# Patient Record
Sex: FEMALE | Race: WHITE | NOT HISPANIC OR LATINO | Employment: OTHER | RURAL
[De-identification: names, ages, dates, MRNs, and addresses within clinical notes are randomized per-mention and may not be internally consistent; named-entity substitution may affect disease eponyms.]

---

## 2020-08-17 ENCOUNTER — OFFICE VISIT (OUTPATIENT)
Dept: FAMILY MEDICINE CLINIC | Facility: CLINIC | Age: 75
End: 2020-08-17

## 2020-08-17 VITALS
BODY MASS INDEX: 34.69 KG/M2 | RESPIRATION RATE: 18 BRPM | OXYGEN SATURATION: 95 % | WEIGHT: 208.2 LBS | SYSTOLIC BLOOD PRESSURE: 126 MMHG | TEMPERATURE: 98.2 F | DIASTOLIC BLOOD PRESSURE: 80 MMHG | HEART RATE: 73 BPM | HEIGHT: 65 IN

## 2020-08-17 DIAGNOSIS — Z76.89 ENCOUNTER TO ESTABLISH CARE: ICD-10-CM

## 2020-08-17 DIAGNOSIS — R06.02 SHORTNESS OF BREATH: ICD-10-CM

## 2020-08-17 DIAGNOSIS — M25.562 ACUTE PAIN OF LEFT KNEE: Primary | ICD-10-CM

## 2020-08-17 PROCEDURE — 99204 OFFICE O/P NEW MOD 45 MIN: CPT | Performed by: FAMILY MEDICINE

## 2020-08-17 RX ORDER — PHENOL 1.4 %
600 AEROSOL, SPRAY (ML) MUCOUS MEMBRANE 2 TIMES DAILY WITH MEALS
COMMUNITY

## 2020-08-17 RX ORDER — IBUPROFEN 200 MG
200 TABLET ORAL EVERY 6 HOURS PRN
COMMUNITY

## 2020-08-17 RX ORDER — MULTIPLE VITAMINS W/ MINERALS TAB 9MG-400MCG
1 TAB ORAL DAILY
COMMUNITY
End: 2022-10-05

## 2020-08-17 NOTE — PROGRESS NOTES
Chief Complaint   Patient presents with   • Knee Pain     Left knee   • Establish Care   • Shortness of Breath       History of Present Illness:  Subjective   Judith Sauceda is a 75 y.o. female.   8/17/2020- The patient is here today to establish care with a new PCP. She state that she overall feels well with a few complaints. She states that her old doctor was Dr. Gonzalez in [place] but she states that she was only seen once. She states that she has a few things she wants to discuss today.     Knee Pain    The incident occurred more than 1 week ago (started hurting at the end of july ). There was no injury mechanism. The pain is present in the left leg, left hip, left knee and left ankle. The quality of the pain is described as aching, shooting, stabbing and cramping. The pain is at a severity of 7/10. The pain is moderate. The pain has been constant since onset. Associated symptoms include an inability to bear weight and a loss of sensation. Pertinent negatives include no loss of motion, muscle weakness, numbness or tingling. Associated symptoms comments: Patient states that she has been having knee pain on and off since the end of July. She states that if she is on it for an extended period of time she states that it hurts her but she states that if she sits for to long it bothers her as well. She states that she gets shooting and stabbing pains in her legs.   . She reports no foreign bodies present. The symptoms are aggravated by weight bearing and movement. She has tried nothing (Ibuprofen to treat ) for the symptoms. The treatment provided no relief.   Shortness of Breath   This is a recurrent problem. The current episode started more than 1 month ago. The problem occurs intermittently. The problem has been unchanged. Associated symptoms include wheezing. Pertinent negatives include no abdominal pain, chest pain, claudication, coryza, ear pain, fever, headaches, hemoptysis, leg pain, leg swelling, neck pain,  orthopnea, PND, rash, rhinorrhea, sore throat, sputum production, swollen glands, syncope or vomiting. The symptoms are aggravated by any activity. Associated symptoms comments: The patient states that her daughter has been nagging at her that she seems to be short of breath especially if she is outside doing things. Patient states that this is something that she really does not see in herself. She states that her daughter keeps telling her all the time it is getting worse but patient states that she really only notices it if she is out in the hot working doing yard work and she states that she has tried to limit that.   . She has tried rest for the symptoms. The treatment provided mild relief. There is no history of allergies, aspirin allergies, asthma, bronchiolitis, CAD, chronic lung disease, COPD, DVT, a heart failure, PE, pneumonia or a recent surgery.        Allergies:  Allergies   Allergen Reactions   • Seasonal Ic [Cholestatin] Itching       Social History:  Social History     Socioeconomic History   • Marital status:      Spouse name: Not on file   • Number of children: Not on file   • Years of education: Not on file   • Highest education level: Not on file   Tobacco Use   • Smoking status: Never Smoker   • Smokeless tobacco: Never Used       Family History:  Family History   Problem Relation Age of Onset   • Heart disease Mother    • Other Mother         osteoparosis       Past Medical History :  Active Ambulatory Problems     Diagnosis Date Noted   • Acute pain of left knee 08/18/2020   • Shortness of breath 08/18/2020     Resolved Ambulatory Problems     Diagnosis Date Noted   • No Resolved Ambulatory Problems     Past Medical History:   Diagnosis Date   • Osteoporosis        Medication List:  Outpatient Encounter Medications as of 8/17/2020   Medication Sig Dispense Refill   • Bisacodyl (LAXATIVE PO) Take  by mouth.     • calcium carbonate (OS-BETHANY) 600 MG tablet Take 600 mg by mouth 2 (Two) Times  "a Day With Meals.     • ibuprofen (ADVIL,MOTRIN) 200 MG tablet Take 200 mg by mouth Every 6 (Six) Hours As Needed for Mild Pain .     • Multiple Vitamins-Minerals (MULTIVITAMIN WITH MINERALS) tablet tablet Take 1 tablet by mouth Daily.     • Psyllium (METAMUCIL FIBER PO) Take  by mouth.       No facility-administered encounter medications on file as of 8/17/2020.        Past Surgical History:  Past Surgical History:   Procedure Laterality Date   • CATARACT EXTRACTION      march 3 and may 1 2020        The following portions of the patient's history were reviewed and updated as appropriate: allergies, current medications, past family history, past medical history, past social history, past surgical history and problem list.    Review Of Systems:  Review of Systems   Constitutional: Negative for fever.   HENT: Negative for ear pain, rhinorrhea, sore throat and swollen glands.    Respiratory: Positive for shortness of breath and wheezing. Negative for hemoptysis and sputum production.    Cardiovascular: Negative for chest pain, orthopnea, claudication, leg swelling, syncope and PND.   Gastrointestinal: Negative for abdominal pain and vomiting.   Musculoskeletal: Negative for neck pain.        Right knee pain   Skin: Negative for rash.   Neurological: Negative for tingling and numbness.       Objective     Physical Exam:  Vital Signs:  Visit Vitals  /80   Pulse 73   Temp 98.2 °F (36.8 °C)   Resp 18   Ht 165.1 cm (65\")   Wt 94.4 kg (208 lb 3.2 oz)   SpO2 95%   BMI 34.65 kg/m²       Physical Exam   Constitutional: She appears well-developed and well-nourished. No distress.   HENT:   Head: Normocephalic.   Right Ear: External ear normal.   Left Ear: External ear normal.   Mouth/Throat: Oropharynx is clear and moist.   Eyes: Conjunctivae are normal. No scleral icterus.   Neck: Normal range of motion.   Cardiovascular: Normal rate, regular rhythm and normal heart sounds.   Pulmonary/Chest: Effort normal and breath " sounds normal.   Musculoskeletal:        Right knee: Tenderness found.   Vitals reviewed.      Assessment/Plan   Assessment and Plan:  Diagnoses and all orders for this visit:    1. Acute pain of left knee (Primary)  Assessment & Plan:  She reports that her symptoms are improving since the injury.  She was encouraged to us NSAIDs and ice.      2. Shortness of breath  Assessment & Plan:  She reports that she has noticed the SOB since moving to [facility] from [facility].  This is likely due to allergies.  She was strongly encouraged to use OTC meds.       3. Encounter to establish care

## 2020-08-18 PROBLEM — R06.02 SHORTNESS OF BREATH: Status: ACTIVE | Noted: 2020-08-18

## 2020-08-18 PROBLEM — M25.562 ACUTE PAIN OF LEFT KNEE: Status: ACTIVE | Noted: 2020-08-18

## 2020-08-19 NOTE — ASSESSMENT & PLAN NOTE
She reports that her symptoms are improving since the injury.  She was encouraged to us NSAIDs and ice.

## 2020-08-19 NOTE — ASSESSMENT & PLAN NOTE
She reports that she has noticed the SOB since moving to [facility] from [facility].  This is likely due to allergies.  She was strongly encouraged to use OTC meds.

## 2020-08-21 ENCOUNTER — HOSPITAL ENCOUNTER (OUTPATIENT)
Dept: GENERAL RADIOLOGY | Facility: HOSPITAL | Age: 75
Discharge: HOME | End: 2020-08-21
Admitting: FAMILY MEDICINE

## 2020-08-21 DIAGNOSIS — M25.562 LEFT KNEE PAIN: ICD-10-CM

## 2020-08-21 PROCEDURE — 73560 X-RAY EXAM OF KNEE 1 OR 2: CPT

## 2020-08-27 ENCOUNTER — OFFICE VISIT (OUTPATIENT)
Dept: FAMILY MEDICINE CLINIC | Facility: CLINIC | Age: 75
End: 2020-08-27

## 2020-08-27 VITALS
DIASTOLIC BLOOD PRESSURE: 93 MMHG | HEART RATE: 84 BPM | RESPIRATION RATE: 16 BRPM | SYSTOLIC BLOOD PRESSURE: 159 MMHG | HEIGHT: 65 IN | OXYGEN SATURATION: 96 % | BODY MASS INDEX: 34.92 KG/M2 | TEMPERATURE: 98 F | WEIGHT: 209.6 LBS

## 2020-08-27 DIAGNOSIS — Z78.0 POST-MENOPAUSAL: ICD-10-CM

## 2020-08-27 DIAGNOSIS — Z00.00 MEDICARE ANNUAL WELLNESS VISIT, SUBSEQUENT: Primary | ICD-10-CM

## 2020-08-27 DIAGNOSIS — I10 ESSENTIAL HYPERTENSION: ICD-10-CM

## 2020-08-27 PROCEDURE — 99213 OFFICE O/P EST LOW 20 MIN: CPT | Performed by: FAMILY MEDICINE

## 2020-08-27 PROCEDURE — G0439 PPPS, SUBSEQ VISIT: HCPCS | Performed by: FAMILY MEDICINE

## 2020-08-27 NOTE — ASSESSMENT & PLAN NOTE
Medicare wellness completed.  Discussed advanced directives.  Patient has a living well she states it was copied in the EHR.  Patient is getting DEXA scan.

## 2020-08-27 NOTE — PROGRESS NOTES
"The ABCs of the Annual Wellness Visit  Subsequent Medicare Wellness Visit    Chief Complaint   Patient presents with   • Medicare Wellness-subsequent       Subjective   History of Present Illness:  Judith Sauceda is a 75 y.o. female who presents for a Subsequent Medicare Wellness Visit.    HEALTH RISK ASSESSMENT    Recent Hospitalizations:  {Hospitalization history:5516548620::\"No hospitalization(s) within the last year.\"}    Current Medical Providers:  Patient Care Team:  Christiano Galindo Sr., MD as PCP - General (Family Medicine)    Smoking Status:  Social History     Tobacco Use   Smoking Status Never Smoker   Smokeless Tobacco Never Used       Alcohol Consumption:  Social History     Substance and Sexual Activity   Alcohol Use Not on file       Depression Screen:   PHQ-2/PHQ-9 Depression Screening 8/27/2020   Little interest or pleasure in doing things 0   Feeling down, depressed, or hopeless 0   Trouble falling or staying asleep, or sleeping too much 0   Feeling tired or having little energy 0   Poor appetite or overeating 0   Feeling bad about yourself - or that you are a failure or have let yourself or your family down 0   Trouble concentrating on things, such as reading the newspaper or watching television 0   Moving or speaking so slowly that other people could have noticed. Or the opposite - being so fidgety or restless that you have been moving around a lot more than usual 0   Thoughts that you would be better off dead, or of hurting yourself in some way 0   Total Score 0       Fall Risk Screen:  STEADI Fall Risk Assessment was completed, and patient is at LOW risk for falls.Assessment completed on:8/27/2020    Health Habits and Functional and Cognitive Screening:  Functional & Cognitive Status 8/27/2020   Do you have difficulty preparing food and eating? No   Do you have difficulty bathing yourself, getting dressed or grooming yourself? No   Do you have difficulty using the toilet? No   Do you have difficulty " "moving around from place to place? No   Do you have trouble with steps or getting out of a bed or a chair? No   Current Diet Well Balanced Diet   Dental Exam Up to date   Eye Exam Up to date   Exercise (times per week) 0 times per week   Current Exercise Activities Include None   Do you need help using the phone?  No   Are you deaf or do you have serious difficulty hearing?  No   Do you need help with transportation? No   Do you need help shopping? No   Do you need help preparing meals?  No   Do you need help with housework?  No   Do you need help with laundry? No   Do you need help taking your medications? No   Do you need help managing money? No   Do you ever drive or ride in a car without wearing a seat belt? No   Have you felt unusual stress, anger or loneliness in the last month? No   Who do you live with? Child   If you need help, do you have trouble finding someone available to you? No   Have you been bothered in the last four weeks by sexual problems? No   Do you have difficulty concentrating, remembering or making decisions? No         Does the patient have evidence of cognitive impairment? {Yes/No w/ pre-defaulted No:93822::\"No\"}    Asprin use counseling:{Aspirin :55089}    Age-appropriate Screening Schedule:  Refer to the list below for future screening recommendations based on patient's age, sex and/or medical conditions. Orders for these recommended tests are listed in the plan section. The patient has been provided with a written plan.    Health Maintenance   Topic Date Due   • TDAP/TD VACCINES (1 - Tdap) 03/05/1955   • ZOSTER VACCINE (1 of 2) 03/05/1994   • DXA SCAN  08/17/2020   • INFLUENZA VACCINE  08/01/2020          The following portions of the patient's history were reviewed and updated as appropriate: {history reviewed:20406::\"allergies\",\"current medications\",\"past family history\",\"past medical history\",\"past social history\",\"past surgical history\",\"problem list\"}.    Outpatient " "Medications Prior to Visit   Medication Sig Dispense Refill   • Bisacodyl (LAXATIVE PO) Take  by mouth.     • calcium carbonate (OS-BETHANY) 600 MG tablet Take 600 mg by mouth 2 (Two) Times a Day With Meals.     • ibuprofen (ADVIL,MOTRIN) 200 MG tablet Take 200 mg by mouth Every 6 (Six) Hours As Needed for Mild Pain .     • Multiple Vitamins-Minerals (MULTIVITAMIN WITH MINERALS) tablet tablet Take 1 tablet by mouth Daily.     • Psyllium (METAMUCIL FIBER PO) Take  by mouth.       No facility-administered medications prior to visit.        Patient Active Problem List   Diagnosis   • Acute pain of left knee   • Shortness of breath       Advanced Care Planning:  {Advanced Directive Status:40080}    Review of Systems    Compared to one year ago, the patient feels her physical health is {better worse same:76708}.  Compared to one year ago, the patient feels her mental health is {better worse same:40050}.    Reviewed chart for potential of high risk medication in the elderly: {Response;Yes/No/NA:5060276246::\"yes\"}  Reviewed chart for potential of harmful drug interactions in the elderly:{Response;Yes/No/NA:1277272517::\"yes\"}    Objective         Vitals:    08/27/20 1100   BP: 159/93   Pulse: 84   Resp: 16   Temp: 98 °F (36.7 °C)   SpO2: 96%   Weight: 95.1 kg (209 lb 9.6 oz)   Height: 165.1 cm (65\")       Body mass index is 34.88 kg/m².  Discussed the patient's BMI with her. The BMI {BMI plan ([facility] measure 421):99237}.    Physical Exam          Assessment/Plan   Medicare Risks and Personalized Health Plan  CMS Preventative Services Quick Reference  {Medicare Wellness Risk Factors and Personalized Health Plan:74983}    The above risks/problems have been discussed with the patient.  Pertinent information has been shared with the patient in the After Visit Summary.  Follow up plans and orders are seen below in the Assessment/Plan Section.    There are no diagnoses linked to this encounter.  Follow Up:  No follow-ups on file. "     An After Visit Summary and PPPS were given to the patient.

## 2020-08-27 NOTE — PROGRESS NOTES
QUICK REFERENCE INFORMATION:  The ABCs of the Annual Wellness Visit    Subsequent Medicare Wellness Visit     HEALTH RISK ASSESSMENT    : 1945    Recent Hospitalizations:  No hospitalization(s) within the last year..  ccc    Current Medical Providers:  Patient Care Team:  Christiano Galindo Sr., MD as PCP - General (Family Medicine)    Smoking Status:  Social History     Tobacco Use   Smoking Status Never Smoker   Smokeless Tobacco Never Used       Alcohol Consumption:  Social History     Substance and Sexual Activity   Alcohol Use Not on file       Depression Screen:   PHQ-2/PHQ-9 Depression Screening 2020   Little interest or pleasure in doing things 0   Feeling down, depressed, or hopeless 0   Trouble falling or staying asleep, or sleeping too much 0   Feeling tired or having little energy 0   Poor appetite or overeating 0   Feeling bad about yourself - or that you are a failure or have let yourself or your family down 0   Trouble concentrating on things, such as reading the newspaper or watching television 0   Moving or speaking so slowly that other people could have noticed. Or the opposite - being so fidgety or restless that you have been moving around a lot more than usual 0   Thoughts that you would be better off dead, or of hurting yourself in some way 0   Total Score 0       Health Habits and Functional and Cognitive Screening:  Functional & Cognitive Status 2020   Do you have difficulty preparing food and eating? No   Do you have difficulty bathing yourself, getting dressed or grooming yourself? No   Do you have difficulty using the toilet? No   Do you have difficulty moving around from place to place? No   Do you have trouble with steps or getting out of a bed or a chair? No   Current Diet Well Balanced Diet   Dental Exam Up to date   Eye Exam Up to date   Exercise (times per week) 0 times per week   Current Exercise Activities Include None   Do you need help using the phone?  No   Are you  deaf or do you have serious difficulty hearing?  No   Do you need help with transportation? No   Do you need help shopping? No   Do you need help preparing meals?  No   Do you need help with housework?  No   Do you need help with laundry? No   Do you need help taking your medications? No   Do you need help managing money? No   Do you ever drive or ride in a car without wearing a seat belt? No   Have you felt unusual stress, anger or loneliness in the last month? No   Who do you live with? Child   If you need help, do you have trouble finding someone available to you? No   Have you been bothered in the last four weeks by sexual problems? No   Do you have difficulty concentrating, remembering or making decisions? No       Does the patient have evidence of cognitive impairment? No    Mini-Mental State Examination (MMSE)        Instructions: Ask the questions in the order listed. Score one point for each correct response within each question or activity.      Maximum Score  Patient’s Score  Questions    5  5  “What is the year?  Season?  Date?  Day of the week?  Month?”    5  5  “Where are we now: State?  County?  Town/city?  Hospital?  Floor?”    3  3  The examiner names three unrelated objects clearly and slowly, then asks the patient to name all three of them. The patient’s response is used for scoring. The examiner repeats them until patient learns all of them, if possible. Number of trials: ___________    5   5 “I would like you to count backward from 100 by sevens.” (93, 86, 79, 72, 65, …) Stop after five answers.   Alternative: “Spell WORLD backwards.” ([id number]-W)    3  1  “Earlier I told you the names of three things. Can you tell me what those were?”    2  2  Show the patient two simple objects, such as a wristwatch and a pencil, and ask the patient to name them.    1  1  “Repeat the phrase: ‘No ifs, ands, or buts.’”    3   3 “Take the paper in your right hand, fold it in half, and put it on the floor.”   (The  examiner gives the patient a piece of blank paper.)    1   1 “Please read this and do what it says.” (Written instruction is “Close your eyes.”)    1   1 “Make up and write a sentence about anything.” (This sentence must contain a noun and a verb.)    1  1  “Please copy this picture.” (The examiner gives the patient a blank piece of paper and asks him/her to draw the symbol below. All 10 angles must be present and two must intersect.)             30   28 TOTAL          Aspirin use counseling: taking ASA inconsistently    Recent Lab Results:               Age-appropriate Screening Schedule:  Refer to the list below for future screening recommendations based on patient's age, sex and/or medical conditions. Orders for these recommended tests are listed in the plan section. The patient has been provided with a written plan.    Health Maintenance   Topic Date Due   • TDAP/TD VACCINES (1 - Tdap) 03/05/1955   • ZOSTER VACCINE (1 of 2) 03/05/1994   • DXA SCAN  08/17/2020   • INFLUENZA VACCINE  08/01/2020        Subjective   History of Present Illness:  Judith Sauceda is a 75 y.o. female who presents for an Annual Wellness Visit.  Compared to one year ago, the patient feels her physical health is the same.  Compared to one year ago, the patient feels her mental health is the same.  History of Present Illness     Allergies:  Allergies   Allergen Reactions   • Seasonal Ic [Cholestatin] Itching       Social History:  Social History     Socioeconomic History   • Marital status:      Spouse name: Not on file   • Number of children: Not on file   • Years of education: Not on file   • Highest education level: Not on file   Tobacco Use   • Smoking status: Never Smoker   • Smokeless tobacco: Never Used       Family History:  Family History   Problem Relation Age of Onset   • Heart disease Mother    • Other Mother         osteoparosis       Past Medical History :  Active Ambulatory Problems     Diagnosis Date Noted   • Acute pain  of left knee 08/18/2020   • Shortness of breath 08/18/2020   • Essential hypertension 08/27/2020   • Medicare annual wellness visit, subsequent 08/27/2020     Resolved Ambulatory Problems     Diagnosis Date Noted   • No Resolved Ambulatory Problems     Past Medical History:   Diagnosis Date   • Constipation    • Osteoporosis        Medication List:  Outpatient Encounter Medications as of 8/27/2020   Medication Sig Dispense Refill   • Bisacodyl (LAXATIVE PO) Take  by mouth.     • calcium carbonate (OS-BETHANY) 600 MG tablet Take 600 mg by mouth 2 (Two) Times a Day With Meals.     • ibuprofen (ADVIL,MOTRIN) 200 MG tablet Take 200 mg by mouth Every 6 (Six) Hours As Needed for Mild Pain .     • Multiple Vitamins-Minerals (MULTIVITAMIN WITH MINERALS) tablet tablet Take 1 tablet by mouth Daily.     • Psyllium (METAMUCIL FIBER PO) Take  by mouth.       No facility-administered encounter medications on file as of 8/27/2020.      Reviewed use of high risk medication in the elderly: yes  Reviewed for potential of harmful drug interactions in the elderly: yes    Past Surgical History:  Past Surgical History:   Procedure Laterality Date   • CATARACT EXTRACTION      march 3 and may 1 2020        The following portions of the patient's history were reviewed and updated as appropriate: allergies, current medications, past family history, past medical history, past social history, past surgical history and problem list.    Review Of Systems:  Review of Systems   Constitutional: Negative for activity change, appetite change and fatigue.   HENT: Negative for congestion, postnasal drip, sinus pressure and sore throat.    Eyes: Negative for blurred vision and itching.   Respiratory: Negative for cough, shortness of breath and wheezing.    Cardiovascular: Negative for chest pain.   Gastrointestinal: Negative for abdominal pain, constipation, nausea, vomiting and GERD.   Endocrine: Negative for cold intolerance and heat intolerance.    "  Genitourinary: Negative for difficulty urinating, dysuria and urinary incontinence.   Musculoskeletal: Negative for back pain, joint swelling and neck pain.   Skin: Negative for color change and rash.   Neurological: Negative for dizziness, speech difficulty, weakness and memory problem.   Psychiatric/Behavioral: Negative for behavioral problems, decreased concentration, suicidal ideas and depressed mood.     I have reviewed and confirmed the accuracy of the ROS as documented by the MA/LPN/RN Christiano Galindo Sr, MD    Objective     Physical Exam:  Vital Signs:  Visit Vitals  /93   Pulse 84   Temp 98 °F (36.7 °C)   Resp 16   Ht 165.1 cm (65\")   Wt 95.1 kg (209 lb 9.6 oz)   SpO2 96%   BMI 34.88 kg/m²       Physical Exam   Constitutional: She is oriented to person, place, and time. She appears well-developed and well-nourished. She is active.   HENT:   Head: Normocephalic and atraumatic.   Nose: Nose normal.   Eyes: Pupils are equal, round, and reactive to light. Conjunctivae and lids are normal.   Neck: Normal range of motion. Neck supple.   Cardiovascular: Normal rate, regular rhythm, normal heart sounds and normal pulses.   Pulmonary/Chest: Effort normal and breath sounds normal.   Abdominal: Soft. Bowel sounds are normal.   Musculoskeletal: Normal range of motion.   Neurological: She is alert and oriented to person, place, and time.   Skin: Skin is warm and dry. Capillary refill takes less than 2 seconds.   Psychiatric: She has a normal mood and affect. Her behavior is normal. Judgment and thought content normal.   Vitals reviewed.      Assessment/Plan   Assessment and Plan:  Patient Self-Management and Personalized Health Advice  The patient has been provided with information about: diet, exercise, weight management, fall prevention and designing advance directives and preventive services including:   · Annual Wellness Visit (AWV).    Advance Care Planning:  ACP discussion was held with the patient during " this visit. Patient has an advance directive in EMR which is still valid.   Identification of Risk Factors:  Risk factors include: Advance Directive Discussion.    Diagnoses and all orders for this visit:    1. Medicare annual wellness visit, subsequent (Primary)  Assessment & Plan:  Medicare wellness completed.  Discussed advanced directives.  Patient has a living well she states it was copied in the EHR.  Patient is getting DEXA scan.      2. Essential hypertension  Assessment & Plan:  Hypertension is worsening.  Continue current treatment regimen.  Dietary sodium restriction.  Weight loss.  Regular aerobic exercise.  Blood pressure will be reassessed in 3 months.    Orders:  -     TSH  -     Comprehensive Metabolic Panel  -     CBC & Differential  -     Lipid Panel With / Chol / HDL Ratio    3. Post-menopausal  -     DEXA Bone Density Axial        Follow Up:  No follow-ups on file.     An After Visit Summary and PPPS with all of these plans were given to the patient.

## 2020-08-27 NOTE — ASSESSMENT & PLAN NOTE
Hypertension is worsening.  Continue current treatment regimen.  Dietary sodium restriction.  Weight loss.  Regular aerobic exercise.  Blood pressure will be reassessed in 3 months.

## 2020-08-28 LAB
ALBUMIN SERPL-MCNC: 4.2 G/DL (ref 3.7–4.7)
ALBUMIN/GLOB SERPL: 1.5 {RATIO} (ref 1.2–2.2)
ALP SERPL-CCNC: 81 IU/L (ref 39–117)
ALT SERPL-CCNC: 19 IU/L (ref 0–32)
AST SERPL-CCNC: 15 IU/L (ref 0–40)
BASOPHILS # BLD AUTO: 0.1 X10E3/UL (ref 0–0.2)
BASOPHILS NFR BLD AUTO: 1 %
BILIRUB SERPL-MCNC: 0.3 MG/DL (ref 0–1.2)
BUN SERPL-MCNC: 14 MG/DL (ref 8–27)
BUN/CREAT SERPL: 16 (ref 12–28)
CALCIUM SERPL-MCNC: 10.6 MG/DL (ref 8.7–10.3)
CHLORIDE SERPL-SCNC: 101 MMOL/L (ref 96–106)
CHOLEST SERPL-MCNC: 195 MG/DL (ref 100–199)
CHOLEST/HDLC SERPL: 4.4 RATIO (ref 0–4.4)
CO2 SERPL-SCNC: 27 MMOL/L (ref 20–29)
CREAT SERPL-MCNC: 0.87 MG/DL (ref 0.57–1)
EOSINOPHIL # BLD AUTO: 0.2 X10E3/UL (ref 0–0.4)
EOSINOPHIL NFR BLD AUTO: 3 %
ERYTHROCYTE [DISTWIDTH] IN BLOOD BY AUTOMATED COUNT: 12.8 % (ref 11.7–15.4)
GLOBULIN SER CALC-MCNC: 2.8 G/DL (ref 1.5–4.5)
GLUCOSE SERPL-MCNC: 137 MG/DL (ref 65–99)
HCT VFR BLD AUTO: 41.7 % (ref 34–46.6)
HDLC SERPL-MCNC: 44 MG/DL
HGB BLD-MCNC: 13.9 G/DL (ref 11.1–15.9)
IMM GRANULOCYTES # BLD AUTO: 0.1 X10E3/UL (ref 0–0.1)
IMM GRANULOCYTES NFR BLD AUTO: 1 %
LDLC SERPL CALC-MCNC: 96 MG/DL (ref 0–99)
LYMPHOCYTES # BLD AUTO: 2.4 X10E3/UL (ref 0.7–3.1)
LYMPHOCYTES NFR BLD AUTO: 27 %
MCH RBC QN AUTO: 29.5 PG (ref 26.6–33)
MCHC RBC AUTO-ENTMCNC: 33.3 G/DL (ref 31.5–35.7)
MCV RBC AUTO: 89 FL (ref 79–97)
MONOCYTES # BLD AUTO: 0.7 X10E3/UL (ref 0.1–0.9)
MONOCYTES NFR BLD AUTO: 8 %
NEUTROPHILS # BLD AUTO: 5.3 X10E3/UL (ref 1.4–7)
NEUTROPHILS NFR BLD AUTO: 60 %
PLATELET # BLD AUTO: 355 X10E3/UL (ref 150–450)
POTASSIUM SERPL-SCNC: 4.8 MMOL/L (ref 3.5–5.2)
PROT SERPL-MCNC: 7 G/DL (ref 6–8.5)
RBC # BLD AUTO: 4.71 X10E6/UL (ref 3.77–5.28)
SODIUM SERPL-SCNC: 143 MMOL/L (ref 134–144)
TRIGL SERPL-MCNC: 277 MG/DL (ref 0–149)
TSH SERPL DL<=0.005 MIU/L-ACNC: 3.66 UIU/ML (ref 0.45–4.5)
VLDLC SERPL CALC-MCNC: 55 MG/DL (ref 5–40)
WBC # BLD AUTO: 8.9 X10E3/UL (ref 3.4–10.8)

## 2020-09-01 ENCOUNTER — HOSPITAL ENCOUNTER (OUTPATIENT)
Dept: BONE DENSITY | Facility: HOSPITAL | Age: 75
Discharge: HOME | End: 2020-09-01
Admitting: FAMILY MEDICINE

## 2020-09-01 PROCEDURE — 77080 DXA BONE DENSITY AXIAL: CPT

## 2021-02-12 ENCOUNTER — APPOINTMENT (OUTPATIENT)
Dept: GENERAL RADIOLOGY | Facility: HOSPITAL | Age: 76
End: 2021-02-12

## 2021-02-12 ENCOUNTER — HOSPITAL ENCOUNTER (OUTPATIENT)
Facility: HOSPITAL | Age: 76
LOS: 1 days | Discharge: HOME | End: 2021-02-13
Attending: EMERGENCY MEDICINE | Admitting: ORTHOPAEDIC SURGERY

## 2021-02-12 DIAGNOSIS — S52.91XA CLOSED FRACTURE OF RIGHT RADIUS AND ULNA, INITIAL ENCOUNTER: Primary | ICD-10-CM

## 2021-02-12 DIAGNOSIS — S52.201A CLOSED FRACTURE OF RIGHT RADIUS AND ULNA, INITIAL ENCOUNTER: Primary | ICD-10-CM

## 2021-02-12 LAB
ABO GROUP BLD: NORMAL
ALBUMIN SERPL-MCNC: 4 G/DL (ref 3.5–5.2)
ALBUMIN/GLOB SERPL: 1.3 G/DL
ALP SERPL-CCNC: 100 U/L (ref 39–117)
ALT SERPL W P-5'-P-CCNC: 61 U/L (ref 1–33)
ANION GAP SERPL CALCULATED.3IONS-SCNC: 10 MMOL/L (ref 5–15)
APTT PPP: 24.2 SECONDS (ref 24–31)
AST SERPL-CCNC: 46 U/L (ref 1–32)
BASOPHILS # BLD AUTO: 0 10*3/MM3 (ref 0–0.2)
BASOPHILS NFR BLD AUTO: 0.4 % (ref 0–1.5)
BILIRUB SERPL-MCNC: 0.2 MG/DL (ref 0–1.2)
BLD GP AB SCN SERPL QL: NEGATIVE
BUN SERPL-MCNC: 16 MG/DL (ref 8–23)
BUN/CREAT SERPL: 20.5 (ref 7–25)
CALCIUM SPEC-SCNC: 9.8 MG/DL (ref 8.6–10.5)
CHLORIDE SERPL-SCNC: 103 MMOL/L (ref 98–107)
CO2 SERPL-SCNC: 25 MMOL/L (ref 22–29)
CREAT SERPL-MCNC: 0.78 MG/DL (ref 0.57–1)
DEPRECATED RDW RBC AUTO: 42.4 FL (ref 37–54)
EOSINOPHIL # BLD AUTO: 0.1 10*3/MM3 (ref 0–0.4)
EOSINOPHIL NFR BLD AUTO: 1 % (ref 0.3–6.2)
ERYTHROCYTE [DISTWIDTH] IN BLOOD BY AUTOMATED COUNT: 13.7 % (ref 12.3–15.4)
GFR SERPL CREATININE-BSD FRML MDRD: 72 ML/MIN/1.73
GLOBULIN UR ELPH-MCNC: 3.2 GM/DL
GLUCOSE SERPL-MCNC: 117 MG/DL (ref 65–99)
HCT VFR BLD AUTO: 36.4 % (ref 34–46.6)
HGB BLD-MCNC: 12.4 G/DL (ref 12–15.9)
INR PPP: 0.98 (ref 0.93–1.1)
LYMPHOCYTES # BLD AUTO: 1.5 10*3/MM3 (ref 0.7–3.1)
LYMPHOCYTES NFR BLD AUTO: 13.9 % (ref 19.6–45.3)
MCH RBC QN AUTO: 30.1 PG (ref 26.6–33)
MCHC RBC AUTO-ENTMCNC: 34.1 G/DL (ref 31.5–35.7)
MCV RBC AUTO: 88.3 FL (ref 79–97)
MONOCYTES # BLD AUTO: 0.9 10*3/MM3 (ref 0.1–0.9)
MONOCYTES NFR BLD AUTO: 8 % (ref 5–12)
NEUTROPHILS NFR BLD AUTO: 76.7 % (ref 42.7–76)
NEUTROPHILS NFR BLD AUTO: 8.3 10*3/MM3 (ref 1.7–7)
NRBC BLD AUTO-RTO: 0 /100 WBC (ref 0–0.2)
PLATELET # BLD AUTO: 277 10*3/MM3 (ref 140–450)
PMV BLD AUTO: 9.2 FL (ref 6–12)
POTASSIUM SERPL-SCNC: 4.1 MMOL/L (ref 3.5–5.2)
PROT SERPL-MCNC: 7.2 G/DL (ref 6–8.5)
PROTHROMBIN TIME: 10.8 SECONDS (ref 9.6–11.7)
RBC # BLD AUTO: 4.12 10*6/MM3 (ref 3.77–5.28)
RH BLD: POSITIVE
SARS-COV-2 RNA PNL SPEC NAA+PROBE: NOT DETECTED
SODIUM SERPL-SCNC: 138 MMOL/L (ref 136–145)
T&S EXPIRATION DATE: NORMAL
WBC # BLD AUTO: 10.8 10*3/MM3 (ref 3.4–10.8)

## 2021-02-12 PROCEDURE — U0003 INFECTIOUS AGENT DETECTION BY NUCLEIC ACID (DNA OR RNA); SEVERE ACUTE RESPIRATORY SYNDROME CORONAVIRUS 2 (SARS-COV-2) (CORONAVIRUS DISEASE [COVID-19]), AMPLIFIED PROBE TECHNIQUE, MAKING USE OF HIGH THROUGHPUT TECHNOLOGIES AS DESCRIBED BY CMS-2020-01-R: HCPCS | Performed by: EMERGENCY MEDICINE

## 2021-02-12 PROCEDURE — 99219 PR INITIAL OBSERVATION CARE/DAY 50 MINUTES: CPT | Performed by: HOSPITALIST

## 2021-02-12 PROCEDURE — 86900 BLOOD TYPING SEROLOGIC ABO: CPT | Performed by: EMERGENCY MEDICINE

## 2021-02-12 PROCEDURE — 86901 BLOOD TYPING SEROLOGIC RH(D): CPT | Performed by: EMERGENCY MEDICINE

## 2021-02-12 PROCEDURE — 85730 THROMBOPLASTIN TIME PARTIAL: CPT | Performed by: EMERGENCY MEDICINE

## 2021-02-12 PROCEDURE — 86900 BLOOD TYPING SEROLOGIC ABO: CPT

## 2021-02-12 PROCEDURE — U0005 INFEC AGEN DETEC AMPLI PROBE: HCPCS | Performed by: EMERGENCY MEDICINE

## 2021-02-12 PROCEDURE — 86901 BLOOD TYPING SEROLOGIC RH(D): CPT

## 2021-02-12 PROCEDURE — C9803 HOPD COVID-19 SPEC COLLECT: HCPCS

## 2021-02-12 PROCEDURE — G0378 HOSPITAL OBSERVATION PER HR: HCPCS

## 2021-02-12 PROCEDURE — 96375 TX/PRO/DX INJ NEW DRUG ADDON: CPT

## 2021-02-12 PROCEDURE — 80053 COMPREHEN METABOLIC PANEL: CPT | Performed by: EMERGENCY MEDICINE

## 2021-02-12 PROCEDURE — 86850 RBC ANTIBODY SCREEN: CPT | Performed by: EMERGENCY MEDICINE

## 2021-02-12 PROCEDURE — 25010000002 MORPHINE PER 10 MG: Performed by: EMERGENCY MEDICINE

## 2021-02-12 PROCEDURE — 93005 ELECTROCARDIOGRAM TRACING: CPT | Performed by: EMERGENCY MEDICINE

## 2021-02-12 PROCEDURE — 85610 PROTHROMBIN TIME: CPT | Performed by: EMERGENCY MEDICINE

## 2021-02-12 PROCEDURE — 99285 EMERGENCY DEPT VISIT HI MDM: CPT

## 2021-02-12 PROCEDURE — 96374 THER/PROPH/DIAG INJ IV PUSH: CPT

## 2021-02-12 PROCEDURE — 73090 X-RAY EXAM OF FOREARM: CPT

## 2021-02-12 PROCEDURE — 85025 COMPLETE CBC W/AUTO DIFF WBC: CPT | Performed by: EMERGENCY MEDICINE

## 2021-02-12 PROCEDURE — 25010000002 ONDANSETRON PER 1 MG: Performed by: EMERGENCY MEDICINE

## 2021-02-12 RX ORDER — ACETAMINOPHEN 650 MG/1
650 SUPPOSITORY RECTAL EVERY 4 HOURS PRN
Status: DISCONTINUED | OUTPATIENT
Start: 2021-02-12 | End: 2021-02-13 | Stop reason: HOSPADM

## 2021-02-12 RX ORDER — SODIUM CHLORIDE 0.9 % (FLUSH) 0.9 %
10 SYRINGE (ML) INJECTION AS NEEDED
Status: DISCONTINUED | OUTPATIENT
Start: 2021-02-12 | End: 2021-02-13 | Stop reason: HOSPADM

## 2021-02-12 RX ORDER — ACETAMINOPHEN 325 MG/1
650 TABLET ORAL EVERY 4 HOURS PRN
Status: DISCONTINUED | OUTPATIENT
Start: 2021-02-12 | End: 2021-02-13 | Stop reason: HOSPADM

## 2021-02-12 RX ORDER — HYDROCODONE BITARTRATE AND ACETAMINOPHEN 5; 325 MG/1; MG/1
1 TABLET ORAL EVERY 6 HOURS PRN
Status: DISCONTINUED | OUTPATIENT
Start: 2021-02-12 | End: 2021-02-13 | Stop reason: HOSPADM

## 2021-02-12 RX ORDER — UREA 10 %
2 LOTION (ML) TOPICAL NIGHTLY
COMMUNITY

## 2021-02-12 RX ORDER — MORPHINE SULFATE 4 MG/ML
4 INJECTION, SOLUTION INTRAMUSCULAR; INTRAVENOUS ONCE
Status: COMPLETED | OUTPATIENT
Start: 2021-02-12 | End: 2021-02-12

## 2021-02-12 RX ORDER — SODIUM CHLORIDE 0.9 % (FLUSH) 0.9 %
10 SYRINGE (ML) INJECTION EVERY 12 HOURS SCHEDULED
Status: DISCONTINUED | OUTPATIENT
Start: 2021-02-12 | End: 2021-02-13 | Stop reason: HOSPADM

## 2021-02-12 RX ORDER — ONDANSETRON 4 MG/1
4 TABLET, FILM COATED ORAL EVERY 6 HOURS PRN
Status: DISCONTINUED | OUTPATIENT
Start: 2021-02-12 | End: 2021-02-13 | Stop reason: HOSPADM

## 2021-02-12 RX ORDER — ONDANSETRON 2 MG/ML
4 INJECTION INTRAMUSCULAR; INTRAVENOUS EVERY 6 HOURS PRN
Status: DISCONTINUED | OUTPATIENT
Start: 2021-02-12 | End: 2021-02-13 | Stop reason: HOSPADM

## 2021-02-12 RX ORDER — ONDANSETRON 2 MG/ML
4 INJECTION INTRAMUSCULAR; INTRAVENOUS ONCE
Status: COMPLETED | OUTPATIENT
Start: 2021-02-12 | End: 2021-02-12

## 2021-02-12 RX ORDER — ACETAMINOPHEN 160 MG/5ML
650 SOLUTION ORAL EVERY 4 HOURS PRN
Status: DISCONTINUED | OUTPATIENT
Start: 2021-02-12 | End: 2021-02-13 | Stop reason: HOSPADM

## 2021-02-12 RX ORDER — ONDANSETRON 2 MG/ML
4 INJECTION INTRAMUSCULAR; INTRAVENOUS ONCE
Status: DISCONTINUED | OUTPATIENT
Start: 2021-02-12 | End: 2021-02-13 | Stop reason: HOSPADM

## 2021-02-12 RX ORDER — MORPHINE SULFATE 4 MG/ML
4 INJECTION, SOLUTION INTRAMUSCULAR; INTRAVENOUS ONCE
Status: COMPLETED | OUTPATIENT
Start: 2021-02-12 | End: 2021-02-13

## 2021-02-12 RX ORDER — MORPHINE SULFATE 4 MG/ML
2 INJECTION, SOLUTION INTRAMUSCULAR; INTRAVENOUS EVERY 4 HOURS PRN
Status: DISCONTINUED | OUTPATIENT
Start: 2021-02-12 | End: 2021-02-13 | Stop reason: HOSPADM

## 2021-02-12 RX ORDER — ALUMINA, MAGNESIA, AND SIMETHICONE 2400; 2400; 240 MG/30ML; MG/30ML; MG/30ML
15 SUSPENSION ORAL EVERY 6 HOURS PRN
Status: DISCONTINUED | OUTPATIENT
Start: 2021-02-12 | End: 2021-02-13 | Stop reason: HOSPADM

## 2021-02-12 RX ORDER — ASPIRIN 325 MG
325 TABLET ORAL DAILY
COMMUNITY
End: 2021-02-12

## 2021-02-12 RX ORDER — CHOLECALCIFEROL (VITAMIN D3) 125 MCG
5 CAPSULE ORAL NIGHTLY PRN
Status: DISCONTINUED | OUTPATIENT
Start: 2021-02-12 | End: 2021-02-13 | Stop reason: HOSPADM

## 2021-02-12 RX ADMIN — Medication 10 ML: at 13:57

## 2021-02-12 RX ADMIN — Medication 10 ML: at 20:49

## 2021-02-12 RX ADMIN — ONDANSETRON 4 MG: 2 INJECTION, SOLUTION INTRAMUSCULAR; INTRAVENOUS at 13:54

## 2021-02-12 RX ADMIN — MORPHINE SULFATE 4 MG: 4 INJECTION INTRAVENOUS at 13:55

## 2021-02-12 NOTE — ED NOTES
Notified staff that patient needs a recollect on their Covid swab d/t wrong test ordered/collected.     Elissa Alvarez, RegSched Rep  02/12/21 1518

## 2021-02-12 NOTE — ED PROVIDER NOTES
Subjective   Chief complaint fall with wrist pain    History of present illness 75-year-old female who states she was out today when she slipped on some ice and fell and caught herself on her right hand complains of pain to the right wrist.  No other complaints or injury.  No loss of consciousness.  No head or neck or back pain.  The pain is moderate severe intensity worse with movement better with rest continuous for the last few hours associate with no other injury no numbness or tingling.  No previous fractures she is right-hand dominant          Review of Systems   Constitutional: Negative for chills and fever.   Respiratory: Negative for chest tightness and shortness of breath.    Cardiovascular: Negative for chest pain and leg swelling.   Gastrointestinal: Negative for abdominal pain and vomiting.   Musculoskeletal: Positive for arthralgias. Negative for back pain and neck pain.   Skin: Negative for color change and pallor.   Neurological: Negative for dizziness, syncope, light-headedness and headaches.   Psychiatric/Behavioral: Negative for agitation and behavioral problems.       Past Medical History:   Diagnosis Date   • Constipation    • Osteoporosis        Allergies   Allergen Reactions   • Seasonal Ic [Cholestatin] Itching       Past Surgical History:   Procedure Laterality Date   • CATARACT EXTRACTION      march 3 and may 1 2020       Family History   Problem Relation Age of Onset   • Heart disease Mother    • Other Mother         osteoparosis       Social History     Socioeconomic History   • Marital status:      Spouse name: Not on file   • Number of children: Not on file   • Years of education: Not on file   • Highest education level: Not on file   Tobacco Use   • Smoking status: Never Smoker   • Smokeless tobacco: Never Used       Prior to Admission medications    Medication Sig Start Date End Date Taking? Authorizing Provider   calcium carbonate (OS-BETHANY) 600 MG tablet Take 600 mg by mouth 2  (Two) Times a Day With Meals.   Yes Jing Higginbotham MD   melatonin 1 MG tablet Take 2 mg by mouth Every Night.   Yes Jing Higginbotham MD   Multiple Vitamins-Minerals (MULTIVITAMIN WITH MINERALS) tablet tablet Take 1 tablet by mouth Daily.   Yes Jing Higginbotham MD   Multiple Vitamins-Minerals (PRESERVISION AREDS 2 PO) Take 1 tablet by mouth Daily.   Yes Jing Higginbotham MD   aspirin 325 MG tablet Take 325 mg by mouth Daily.  2/12/21 Yes Jing Higginbotham MD   ibuprofen (ADVIL,MOTRIN) 200 MG tablet Take 200 mg by mouth Every 6 (Six) Hours As Needed for Mild Pain .    Jing Higginbotham MD   Psyllium (METAMUCIL FIBER PO) Take  by mouth.    Jing Higginbotham MD   Bisacodyl (LAXATIVE PO) Take  by mouth.  2/12/21  Jing Higginbotham MD         Objective   Physical Exam  75-year-old awake alert no acute distress  HEENT no obvious trauma extraocular muscles intact pupils equal round reactive no obvious facial trauma  Back no surface lumbar spine tenderness noted  Chest wall nontender  Lungs clear no retractions  Heart regular without murmur  Abdomen was soft nontender no bruising  Extremities left arm legs and pelvis full range of motion no obvious deformity or pain.  No shortening rotation to the legs.  Examination of the right arm and wrist no shoulder pain no elbow pain there is obvious swelling and pain at the wrist no snuffbox pain patient can move her fingers without difficulty moves her thumb without difficulty there is an abrasion over the ring finger but no pain with full range of motion no mallet or boutonniere deformity.  Patient has good cap refill is 2 seconds good skin turgor is warm and dry and distal neurovascular sensory intact.  Patient has no radial nerve palsy patient has able to open and close her fingers without difficulty hitchhike oppose touch her finger and flex and extend the fingers and wrist.  No compartment syndrome no open wounds.  Neuro patient awake alert  orientated x4 with a Varun Coma Scale 15  Procedures  Patient had a Ortho-Glass splint applied patient had a gauze and sleeve applied to her arm first and then she had a Ortho-Glass splint sugar tong applied.  And an Ace wrap.  The patient remained neurovascular motor sensor intact good cap refill hands warm and dry and no evidence of compartment syndrome or the cast being too tight tolerated no problems or complications.         ED Course       Results for orders placed or performed during the hospital encounter of 02/12/21   ECG 12 Lead   Result Value Ref Range    QT Interval 375 ms     Xr Forearm 2 View Right    Result Date: 2/12/2021  Comminuted impacted intra-articular fracture of the right distal radial metaphysis. No gross dislocation.  Electronically Signed By-Shari Rene MD On:2/12/2021 2:24 PM This report was finalized on [id number] by  Shari Rene MD.    Medications   sodium chloride 0.9 % flush 10 mL (10 mL Intravenous Given 2/12/21 1357)   Morphine sulfate (PF) injection 4 mg (has no administration in time range)   ondansetron (ZOFRAN) injection 4 mg (has no administration in time range)   Morphine sulfate (PF) injection 4 mg (4 mg Intravenous Given 2/12/21 1355)   ondansetron (ZOFRAN) injection 4 mg (4 mg Intravenous Given 2/12/21 1354)          EKG my interpretation normal sinus rhythm rate of 75 left axis deviation and a QTC of 404 borderline EKG nothing old to compare with                                  MDM  Number of Diagnoses or Management Options  Closed fracture of right radius and ulna, initial encounter:   Diagnosis management comments: Medical decision making.  Patient IV established placed on a cardiac monitor with normal sinus rhythm and had the above exam and evaluation.  The patient was given morphine 4 IV and 4 Zofran IV.  Had x-rays obtained patient required additional 4 mg morphine IV for Zofran IV.  X-rays obtained showed a comminuted distal radial ulnar fracture.   Patient had a splint applied as procedure note noted.  Patient remained distally neurovascular motor sensor intact without any evidence of compartment syndrome pre or post splint.  Patient was elevated the patient was made aware of findings I talked to orthopedic surgeon Dr. Francois I talked to the hospitalist nurse practitioner.  Patient will be admitted to the hospital EKG was reviewed which showed no acute findings.  X-rays reviewed by me as well.  Laboratory patient is pending patient's daughter made aware of findings and will be admitted for further work-up and care       Amount and/or Complexity of Data Reviewed  Clinical lab tests: ordered  Tests in the radiology section of CPT®: reviewed  Tests in the medicine section of CPT®: reviewed  Discussion of test results with the performing providers: yes  Discuss the patient with other providers: yes  Independent visualization of images, tracings, or specimens: yes    Risk of Complications, Morbidity, and/or Mortality  Presenting problems: low  Diagnostic procedures: low  Management options: low    Patient Progress  Patient progress: improved      Final diagnoses:   Closed fracture of right radius and ulna, initial encounter            Melchor Wiggins MD  02/12/21 150       Melchor Wiggins MD  02/12/21 1508

## 2021-02-12 NOTE — ED NOTES
Call pt's daughter, Jayla Nichols, at pt's request to notify of room number 4102 for admission and gave daughter the nurses' station phone number for [facility].     Annie Buitrago, RN  02/12/21 4002

## 2021-02-12 NOTE — ED NOTES
Pt states she slipped on ice today, falling hurting her right wrist. Pt c/o right wrist pain and swelling     Annie Buitrago, RN  02/12/21 4138

## 2021-02-12 NOTE — H&P
[facility] Medicine Services      Patient Name: Judith Sauceda  : 1945  MRN: [id number]  Primary Care Physician: Christiano Galindo Sr., MD  Date of admission: 2021    Patient Care Team:  Christiano Galindo Sr., MD as PCP - General (Family Medicine)          Subjective   History Present Illness     Chief Complaint:   Chief Complaint   Patient presents with   • Fall       Ms. Sauceda is a 75 y.o. female with PMH of osteoporosis who slipped and fell in the driveway trying to get to her car. She tried to catch herself with her right hand. She did not hit her head. She presented to [facility]  with right wrist and forearm pain. In the ER she was found to have XR right forearm that showed comminuted impacted intra-articular fracture of the right distal radial metaphysis.  No gross dislocation.  Pt was splinted in the ER. Ortho was consulted and requested for hospitalist admission.      Review of Systems   Constitution: Negative.   HENT: Negative.    Eyes: Negative.    Cardiovascular: Negative.    Respiratory: Negative.    Endocrine: Negative.    Hematologic/Lymphatic: Negative.    Skin: Negative.    Musculoskeletal: Positive for joint pain.   Gastrointestinal: Negative.    Genitourinary: Negative.    Neurological: Negative.    Psychiatric/Behavioral: Negative.    Allergic/Immunologic: Negative.    All other systems reviewed and are negative.          Personal History     Past Medical History:   Past Medical History:   Diagnosis Date   • Constipation    • Osteoporosis        Surgical History:      Past Surgical History:   Procedure Laterality Date   • CATARACT EXTRACTION      march 3 and may 1 2020       Family History: family history includes Heart disease in her mother; Other in her mother.     Social History:  reports that she has never smoked. She has never used smokeless tobacco.      Medications:  Prior to Admission medications    Medication Sig Start Date End Date Taking? Authorizing Provider    calcium carbonate (OS-BETHANY) 600 MG tablet Take 600 mg by mouth 2 (Two) Times a Day With Meals.   Yes Jing Higginbotham MD   melatonin 1 MG tablet Take 2 mg by mouth Every Night.   Yes Jing Higginbotham MD   Multiple Vitamins-Minerals (MULTIVITAMIN WITH MINERALS) tablet tablet Take 1 tablet by mouth Daily.   Yes Jing Higginbotham MD   Multiple Vitamins-Minerals (PRESERVISION AREDS 2 PO) Take 1 tablet by mouth Daily.   Yes Jing Higginbotham MD   aspirin 325 MG tablet Take 325 mg by mouth Daily.  2/12/21 Yes Jing Higginbotham MD   ibuprofen (ADVIL,MOTRIN) 200 MG tablet Take 200 mg by mouth Every 6 (Six) Hours As Needed for Mild Pain .    Jing Higginbotham MD   Psyllium (METAMUCIL FIBER PO) Take  by mouth.    Jing Higginbotham MD   Bisacodyl (LAXATIVE PO) Take  by mouth.  2/12/21  Jing Higginbotham MD       Allergies:    Allergies   Allergen Reactions   • Seasonal Ic [Cholestatin] Itching       Objective   Objective     Vital Signs  Temp:  [98.2 °F (36.8 °C)] 98.2 °F (36.8 °C)  Heart Rate:  [78-86] 79  Resp:  [12-16] 16  BP: (133-164)/(77-92) 133/77  SpO2:  [92 %-97 %] 92 %  on   ;   Device (Oxygen Therapy): room air  Body mass index is 32.98 kg/m².    Physical Exam  Vitals signs reviewed.   Constitutional:       Appearance: Normal appearance.   HENT:      Head: Normocephalic.   Eyes:      Pupils: Pupils are equal, round, and reactive to light.   Neck:      Musculoskeletal: Normal range of motion.   Cardiovascular:      Rate and Rhythm: Normal rate and regular rhythm.      Pulses: Normal pulses.      Heart sounds: Normal heart sounds.   Pulmonary:      Effort: Pulmonary effort is normal.      Breath sounds: Normal breath sounds.   Abdominal:      General: Bowel sounds are normal.      Palpations: Abdomen is soft.   Musculoskeletal:      Comments: Right wrist not assessed good capillary refill, splinted in ER   Skin:     General: Skin is warm.   Neurological:      Mental Status: She is  alert and oriented to person, place, and time.   Psychiatric:         Mood and Affect: Mood normal.         Behavior: Behavior normal.         Results Review:  I have personally reviewed most recent radiology images and interpretations and agree with findings    Results from last 7 days   Lab Units 02/12/21  1507   WBC 10*3/mm3 10.80   HEMOGLOBIN g/dL 12.4   HEMATOCRIT % 36.4   PLATELETS 10*3/mm3 277   INR  0.98           Invalid input(s):  ALKPHOS  CrCl cannot be calculated (Patient's most recent lab result is older than the maximum 30 days allowed.).  Brief Urine Lab Results     None          Microbiology Results (last 10 days)     ** No results found for the last 240 hours. **          ECG/EMG Results (most recent)     Procedure Component Value Units Date/Time    ECG 12 Lead [[id number]] Collected: 02/12/21 1453     Updated: 02/12/21 1503     QT Interval 375 ms     Narrative:      HEART RATE= 75  bpm  RR Interval= 804  ms  NM Interval= 167  ms  P Horizontal Axis= 0  deg  P Front Axis= 17  deg  QRSD Interval= 90  ms  QT Interval= 375  ms  QRS Axis= -34  deg  T Wave Axis= 4  deg  - BORDERLINE ECG -  Sinus rhythm  Left axis deviation  Low voltage, precordial leads  Consider anterior infarct  No previous ECG available for comparison  Electronically Signed By:   Date and Time of Study: 2021-02-12 14:53:48                    Xr Forearm 2 View Right    Result Date: 2/12/2021  Comminuted impacted intra-articular fracture of the right distal radial metaphysis. No gross dislocation.  Electronically Signed By-Shari Rene MD On:2/12/2021 2:24 PM This report was finalized on [id number] by  Shari Rene MD.        CrCl cannot be calculated (Patient's most recent lab result is older than the maximum 30 days allowed.).    Assessment/Plan   Assessment/Plan       Active Hospital Problems    Diagnosis  POA   • Closed fracture of right radius and ulna [S52.91XA, S52.201A]  Yes      Resolved Hospital Problems   No resolved  problems to display.     Right radius and ulna fracture  -XR right forearm: comminuted impacted intra-articular fracture of the right distal radial metaphysis.  No gross dislocation.   -splinted in ER  -ortho consulted  -pain control      VTE Prophylaxis - SCDs      CODE STATUS:    Code Status and Medical Interventions:   Ordered at: 02/12/21 1530     Level Of Support Discussed With:    Patient     Code Status:    CPR     Medical Interventions (Level of Support Prior to Arrest):    Full       This patient has been examined wearing appropriate Personal Protective Equipment  02/12/21      I discussed the patient's findings and my recommendations with patient and nursing staff.      Signature: Electronically signed by ARLENE Fitch, 02/12/21, 3:31 PM EST.    [facility] Hospitalist Team    Hospitalist / Attending note.  Patient seen and examined, chart reviewed.  Agree with above.  75-year-old female coming in with fall resulting in fracture involving the right radius and ulna.    Vitals reviewed  Chest, bilateral entry normal vesicular breathing  Cardiovascular, S1-S2 there is no murmur  Abdominal soft nontender good sounds audible    Impression  Right radius and ulna fracture  Osteoporosis  Chronic constipation    Plan  Ortho consult in place  Pain control  Agree with above.    Elodia Anne MD

## 2021-02-13 ENCOUNTER — APPOINTMENT (OUTPATIENT)
Dept: GENERAL RADIOLOGY | Facility: HOSPITAL | Age: 76
End: 2021-02-13

## 2021-02-13 ENCOUNTER — ANESTHESIA EVENT (OUTPATIENT)
Dept: PERIOP | Facility: HOSPITAL | Age: 76
End: 2021-02-13

## 2021-02-13 ENCOUNTER — ANESTHESIA (OUTPATIENT)
Dept: PERIOP | Facility: HOSPITAL | Age: 76
End: 2021-02-13

## 2021-02-13 ENCOUNTER — READMISSION MANAGEMENT (OUTPATIENT)
Dept: CALL CENTER | Facility: HOSPITAL | Age: 76
End: 2021-02-13

## 2021-02-13 VITALS
HEIGHT: 67 IN | RESPIRATION RATE: 18 BRPM | HEART RATE: 75 BPM | DIASTOLIC BLOOD PRESSURE: 80 MMHG | OXYGEN SATURATION: 92 % | WEIGHT: 210.54 LBS | BODY MASS INDEX: 33.04 KG/M2 | TEMPERATURE: 98 F | SYSTOLIC BLOOD PRESSURE: 130 MMHG

## 2021-02-13 LAB
ANION GAP SERPL CALCULATED.3IONS-SCNC: 11 MMOL/L (ref 5–15)
BASOPHILS # BLD AUTO: 0 10*3/MM3 (ref 0–0.2)
BASOPHILS NFR BLD AUTO: 0.4 % (ref 0–1.5)
BUN SERPL-MCNC: 15 MG/DL (ref 8–23)
BUN/CREAT SERPL: 19.2 (ref 7–25)
CALCIUM SPEC-SCNC: 8.6 MG/DL (ref 8.6–10.5)
CHLORIDE SERPL-SCNC: 106 MMOL/L (ref 98–107)
CO2 SERPL-SCNC: 24 MMOL/L (ref 22–29)
CREAT SERPL-MCNC: 0.78 MG/DL (ref 0.57–1)
DEPRECATED RDW RBC AUTO: 44.2 FL (ref 37–54)
EOSINOPHIL # BLD AUTO: 0.2 10*3/MM3 (ref 0–0.4)
EOSINOPHIL NFR BLD AUTO: 1.8 % (ref 0.3–6.2)
ERYTHROCYTE [DISTWIDTH] IN BLOOD BY AUTOMATED COUNT: 14.1 % (ref 12.3–15.4)
GFR SERPL CREATININE-BSD FRML MDRD: 72 ML/MIN/1.73
GLUCOSE SERPL-MCNC: 116 MG/DL (ref 65–99)
HCT VFR BLD AUTO: 35.4 % (ref 34–46.6)
HGB BLD-MCNC: 12 G/DL (ref 12–15.9)
LYMPHOCYTES # BLD AUTO: 1.9 10*3/MM3 (ref 0.7–3.1)
LYMPHOCYTES NFR BLD AUTO: 18 % (ref 19.6–45.3)
MCH RBC QN AUTO: 30.1 PG (ref 26.6–33)
MCHC RBC AUTO-ENTMCNC: 33.8 G/DL (ref 31.5–35.7)
MCV RBC AUTO: 89.2 FL (ref 79–97)
MONOCYTES # BLD AUTO: 1 10*3/MM3 (ref 0.1–0.9)
MONOCYTES NFR BLD AUTO: 9.6 % (ref 5–12)
NEUTROPHILS NFR BLD AUTO: 7.5 10*3/MM3 (ref 1.7–7)
NEUTROPHILS NFR BLD AUTO: 70.2 % (ref 42.7–76)
NRBC BLD AUTO-RTO: 0 /100 WBC (ref 0–0.2)
PLATELET # BLD AUTO: 258 10*3/MM3 (ref 140–450)
PMV BLD AUTO: 9.5 FL (ref 6–12)
POTASSIUM SERPL-SCNC: 4.2 MMOL/L (ref 3.5–5.2)
QT INTERVAL: 375 MS
RBC # BLD AUTO: 3.97 10*6/MM3 (ref 3.77–5.28)
SODIUM SERPL-SCNC: 141 MMOL/L (ref 136–145)
WBC # BLD AUTO: 10.7 10*3/MM3 (ref 3.4–10.8)

## 2021-02-13 PROCEDURE — 25010000002 ROPIVACAINE PER 1 MG: Performed by: ANESTHESIOLOGY

## 2021-02-13 PROCEDURE — 25010000002 ONDANSETRON PER 1 MG: Performed by: NURSE PRACTITIONER

## 2021-02-13 PROCEDURE — 73100 X-RAY EXAM OF WRIST: CPT

## 2021-02-13 PROCEDURE — C1713 ANCHOR/SCREW BN/BN,TIS/BN: HCPCS | Performed by: ORTHOPAEDIC SURGERY

## 2021-02-13 PROCEDURE — 80048 BASIC METABOLIC PNL TOTAL CA: CPT | Performed by: NURSE PRACTITIONER

## 2021-02-13 PROCEDURE — 96376 TX/PRO/DX INJ SAME DRUG ADON: CPT

## 2021-02-13 PROCEDURE — G0378 HOSPITAL OBSERVATION PER HR: HCPCS

## 2021-02-13 PROCEDURE — 25010000002 FENTANYL CITRATE (PF) 100 MCG/2ML SOLUTION: Performed by: ANESTHESIOLOGY

## 2021-02-13 PROCEDURE — 25010000002 MORPHINE PER 10 MG: Performed by: EMERGENCY MEDICINE

## 2021-02-13 PROCEDURE — 85025 COMPLETE CBC W/AUTO DIFF WBC: CPT | Performed by: NURSE PRACTITIONER

## 2021-02-13 PROCEDURE — 99225 PR SBSQ OBSERVATION CARE/DAY 25 MINUTES: CPT | Performed by: HOSPITALIST

## 2021-02-13 PROCEDURE — 76942 ECHO GUIDE FOR BIOPSY: CPT | Performed by: ORTHOPAEDIC SURGERY

## 2021-02-13 PROCEDURE — 25010000002 PROPOFOL 200 MG/20ML EMULSION: Performed by: ANESTHESIOLOGY

## 2021-02-13 PROCEDURE — 25010000002 FENTANYL CITRATE (PF) 250 MCG/5ML SOLUTION: Performed by: ANESTHESIOLOGY

## 2021-02-13 PROCEDURE — 25010000002 DEXAMETHASONE PER 1 MG: Performed by: ANESTHESIOLOGY

## 2021-02-13 PROCEDURE — 76000 FLUOROSCOPY <1 HR PHYS/QHP: CPT

## 2021-02-13 DEVICE — BONE SCREW, T7
Type: IMPLANTABLE DEVICE | Site: WRIST | Status: FUNCTIONAL
Brand: VARIAX

## 2021-02-13 DEVICE — LOCKING PEG, T7
Type: IMPLANTABLE DEVICE | Site: WRIST | Status: FUNCTIONAL
Brand: VARIAX

## 2021-02-13 DEVICE — VOLAR SMARTLOCK DR PLATE,NARR. RI,SHORT
Type: IMPLANTABLE DEVICE | Site: WRIST | Status: FUNCTIONAL
Brand: VARIAX

## 2021-02-13 RX ORDER — PROPOFOL 10 MG/ML
INJECTION, EMULSION INTRAVENOUS AS NEEDED
Status: DISCONTINUED | OUTPATIENT
Start: 2021-02-13 | End: 2021-02-13 | Stop reason: SURG

## 2021-02-13 RX ORDER — DEXAMETHASONE SODIUM PHOSPHATE 4 MG/ML
INJECTION, SOLUTION INTRA-ARTICULAR; INTRALESIONAL; INTRAMUSCULAR; INTRAVENOUS; SOFT TISSUE
Status: COMPLETED | OUTPATIENT
Start: 2021-02-13 | End: 2021-02-13

## 2021-02-13 RX ORDER — PHENYLEPHRINE HCL IN 0.9% NACL 0.5 MG/5ML
SYRINGE (ML) INTRAVENOUS AS NEEDED
Status: DISCONTINUED | OUTPATIENT
Start: 2021-02-13 | End: 2021-02-13 | Stop reason: SURG

## 2021-02-13 RX ORDER — FENTANYL CITRATE 50 UG/ML
INJECTION, SOLUTION INTRAMUSCULAR; INTRAVENOUS AS NEEDED
Status: DISCONTINUED | OUTPATIENT
Start: 2021-02-13 | End: 2021-02-13 | Stop reason: SURG

## 2021-02-13 RX ORDER — HYDROCODONE BITARTRATE AND ACETAMINOPHEN 5; 325 MG/1; MG/1
1 TABLET ORAL ONCE AS NEEDED
Status: CANCELLED | OUTPATIENT
Start: 2021-02-13

## 2021-02-13 RX ORDER — ROCURONIUM BROMIDE 10 MG/ML
INJECTION, SOLUTION INTRAVENOUS AS NEEDED
Status: DISCONTINUED | OUTPATIENT
Start: 2021-02-13 | End: 2021-02-13 | Stop reason: SURG

## 2021-02-13 RX ORDER — ONDANSETRON 2 MG/ML
4 INJECTION INTRAMUSCULAR; INTRAVENOUS ONCE AS NEEDED
Status: CANCELLED | OUTPATIENT
Start: 2021-02-13

## 2021-02-13 RX ORDER — HYDROCODONE BITARTRATE AND ACETAMINOPHEN 10; 325 MG/1; MG/1
1 TABLET ORAL EVERY 4 HOURS PRN
Qty: 42 TABLET | Refills: 0 | Status: SHIPPED | OUTPATIENT
Start: 2021-02-13 | End: 2022-10-05

## 2021-02-13 RX ORDER — FENTANYL CITRATE 50 UG/ML
50 INJECTION, SOLUTION INTRAMUSCULAR; INTRAVENOUS
Status: CANCELLED | OUTPATIENT
Start: 2021-02-13

## 2021-02-13 RX ORDER — ROPIVACAINE HYDROCHLORIDE 5 MG/ML
INJECTION, SOLUTION EPIDURAL; INFILTRATION; PERINEURAL
Status: COMPLETED | OUTPATIENT
Start: 2021-02-13 | End: 2021-02-13

## 2021-02-13 RX ORDER — ACETAMINOPHEN 325 MG/1
650 TABLET ORAL ONCE
Status: COMPLETED | OUTPATIENT
Start: 2021-02-13 | End: 2021-02-13

## 2021-02-13 RX ORDER — FENTANYL CITRATE 50 UG/ML
INJECTION, SOLUTION INTRAMUSCULAR; INTRAVENOUS
Status: COMPLETED | OUTPATIENT
Start: 2021-02-13 | End: 2021-02-13

## 2021-02-13 RX ADMIN — SUGAMMADEX 150 MG: 100 INJECTION, SOLUTION INTRAVENOUS at 12:56

## 2021-02-13 RX ADMIN — Medication 10 ML: at 09:15

## 2021-02-13 RX ADMIN — PHENYLEPHRINE HYDROCHLORIDE 100 MCG: 10 INJECTION INTRAVENOUS at 12:47

## 2021-02-13 RX ADMIN — PROPOFOL 100 MG: 10 INJECTION, EMULSION INTRAVENOUS at 11:55

## 2021-02-13 RX ADMIN — PROPOFOL 30 MG: 10 INJECTION, EMULSION INTRAVENOUS at 11:56

## 2021-02-13 RX ADMIN — ONDANSETRON 4 MG: 2 INJECTION, SOLUTION INTRAMUSCULAR; INTRAVENOUS at 06:59

## 2021-02-13 RX ADMIN — PROPOFOL 30 MG: 10 INJECTION, EMULSION INTRAVENOUS at 11:57

## 2021-02-13 RX ADMIN — DEXAMETHASONE SODIUM PHOSPHATE 4 MG: 4 INJECTION, SOLUTION INTRAMUSCULAR; INTRAVENOUS at 12:32

## 2021-02-13 RX ADMIN — FENTANYL CITRATE 25 MCG: 50 INJECTION, SOLUTION INTRAMUSCULAR; INTRAVENOUS at 11:53

## 2021-02-13 RX ADMIN — MORPHINE SULFATE 4 MG: 4 INJECTION INTRAVENOUS at 07:00

## 2021-02-13 RX ADMIN — CEFAZOLIN SODIUM 2 G: 1 INJECTION, POWDER, FOR SOLUTION INTRAMUSCULAR; INTRAVENOUS at 11:55

## 2021-02-13 RX ADMIN — PHENYLEPHRINE HYDROCHLORIDE 100 MCG: 10 INJECTION INTRAVENOUS at 12:26

## 2021-02-13 RX ADMIN — FENTANYL CITRATE 75 MCG: 50 INJECTION, SOLUTION INTRAMUSCULAR; INTRAVENOUS at 12:32

## 2021-02-13 RX ADMIN — ROCURONIUM BROMIDE 40 MG: 10 INJECTION, SOLUTION INTRAVENOUS at 12:00

## 2021-02-13 RX ADMIN — ACETAMINOPHEN 650 MG: 325 TABLET, FILM COATED ORAL at 15:14

## 2021-02-13 RX ADMIN — ROPIVACAINE HYDROCHLORIDE 30 ML: 5 INJECTION, SOLUTION EPIDURAL; INFILTRATION; PERINEURAL at 12:32

## 2021-02-13 NOTE — ANESTHESIA PREPROCEDURE EVALUATION
Anesthesia Evaluation     Patient summary reviewed and Nursing notes reviewed   NPO Solid Status: > 8 hours  NPO Liquid Status: > 8 hours           Airway   Mallampati: II  Neck ROM: limited  No difficulty expected  Dental - normal exam     Pulmonary    (+) shortness of breath,   Cardiovascular - normal exam        Neuro/Psych  GI/Hepatic/Renal/Endo      Musculoskeletal     Abdominal    Substance History      OB/GYN          Other                        Anesthesia Plan    ASA 2     general with block       Anesthetic plan, all risks, benefits, and alternatives have been provided, discussed and informed consent has been obtained with: patient.

## 2021-02-13 NOTE — ANESTHESIA PROCEDURE NOTES
Peripheral Block    Pre-sedation assessment completed: 2/13/2021 11:25 AM    Patient location during procedure: OR  Start time: 2/13/2021 11:35 AM  Stop time: 2/13/2021 11:40 AM  Reason for block: at surgeon's request and secondary anesthetic  Performed by  Anesthesiologist: Neetu Fagan MD  Preanesthetic Checklist  Completed: patient identified, site marked, surgical consent, pre-op evaluation, timeout performed, IV checked, risks and benefits discussed and monitors and equipment checked  Prep:  Pt Position: sitting (semi recumbent)  Sterile barriers:cap, gloves and sterile barriers  Prep: ChloraPrep  Patient monitoring: blood pressure monitoring, continuous pulse oximetry and EKG  Procedure  Sedation:yes  Performed under: local infiltration  Guidance:ultrasound guided  ULTRASOUND INTERPRETATION. Using ultrasound guidance a gauge needle was placed in close proximity to the brachial plexus nerve, at which point, under ultrasound guidance anesthetic was injected in the area of the nerve and spread of the anesthesia was seen on ultrasound in close proximity thereto.  There were no abnormalities seen on ultrasound; a digital image was taken; and the patient tolerated the procedure with no complications. Images:still images obtained, printed/placed on chart    Laterality:right  Block Type:supraclavicular  Injection Technique:single-shot  Needle Type:echogenic  Needle Gauge:21 G  Resistance on Injection: none  Sedation medications used: fentaNYL citrate (PF) (SUBLIMAZE) injection, 75 mcg  Medications Used: dexamethasone (DECADRON) injection, 4 mg  ropivacaine (NAROPIN) 0.5 % injection, 30 mL      Post Assessment  Patient Tolerance:comfortable throughout block  Complications:no

## 2021-02-13 NOTE — PROGRESS NOTES
"      [facility] Medicine Services Daily Progress Note      Hospitalist Team  LOS 1 days      Patient Care Team:  Christiano Galindo Sr., MD as PCP - General (Family Medicine)    Patient Location: 4102/1      Subjective   Subjective   Denies for any chest pain, no nausea no vomiting no abdominal pain.  Chief Complaint / Subjective  Chief Complaint   Patient presents with   • Fall         Brief Synopsis of Hospital Course/HPI  Ms. Sauceda is a 75 y.o. female with PMH of osteoporosis who slipped and fell in the driveway trying to get to her car. She tried to catch herself with her right hand. She did not hit her head. She presented to [facility] 2/12 with right wrist and forearm pain. In the ER she was found to have XR right forearm that showed comminuted impacted intra-articular fracture of the right distal radial metaphysis.  No gross dislocation.  Pt was splinted in the ER. Ortho was consulted and requested for hospitalist admission.    Date::          ROS      Objective   Objective      Vital Signs  Temp:  [98 °F (36.7 °C)-99.7 °F (37.6 °C)] 98.6 °F (37 °C)  Heart Rate:  [72-86] 77  Resp:  [12-16] 15  BP: (106-164)/(67-95) 118/72  Oxygen Therapy  SpO2: 93 %  Pulse Oximetry Type: Intermittent  Device (Oxygen Therapy): room air  Flowsheet Rows      First Filed Value   Admission Height  170.2 cm (67\") Documented at 02/12/2021 1325   Admission Weight  95.5 kg (210 lb 8.6 oz) Documented at 02/12/2021 1325        Intake & Output (last 3 days)       02/10 0701 - 02/11 0700 02/11 0701 - 02/12 0700 02/12 0701 - 02/13 0700 02/13 0701 - 02/14 0700    P.O.   480     Total Intake(mL/kg)   480 (5)     Urine (mL/kg/hr)   400 200 (0.7)    Total Output   400 200    Net   +80 -200            Urine Unmeasured Occurrence   3 x         Lines, Drains & Airways    Active LDAs     Name:   Placement date:   Placement time:   Site:   Days:    Peripheral IV 02/12/21 1352 Left Antecubital   02/12/21    1352    Antecubital   less than 1 "                  Physical Exam:    Physical Exam          Procedures:    Procedure(s):  OPEN REDUCTION INTERNAL FIXATION DISTAL RADIUS          Results Review:     I reviewed the patient's new clinical results.      Lab Results (last 24 hours)     Procedure Component Value Units Date/Time    Basic Metabolic Panel [[id number]]  (Abnormal) Collected: 02/13/21 0252    Specimen: Blood Updated: 02/13/21 0328     Glucose 116 mg/dL      BUN 15 mg/dL      Creatinine 0.78 mg/dL      Sodium 141 mmol/L      Potassium 4.2 mmol/L      Chloride 106 mmol/L      CO2 24.0 mmol/L      Calcium 8.6 mg/dL      eGFR Non African Amer 72 mL/min/1.73      BUN/Creatinine Ratio 19.2     Anion Gap 11.0 mmol/L     Narrative:      GFR Normal >60  Chronic Kidney Disease <60  Kidney Failure <15      CBC Auto Differential [[id number]]  (Abnormal) Collected: 02/13/21 0252    Specimen: Blood Updated: 02/13/21 0319     WBC 10.70 10*3/mm3      RBC 3.97 10*6/mm3      Hemoglobin 12.0 g/dL      Hematocrit 35.4 %      MCV 89.2 fL      MCH 30.1 pg      MCHC 33.8 g/dL      RDW 14.1 %      RDW-SD 44.2 fl      MPV 9.5 fL      Platelets 258 10*3/mm3      Neutrophil % 70.2 %      Lymphocyte % 18.0 %      Monocyte % 9.6 %      Eosinophil % 1.8 %      Basophil % 0.4 %      Neutrophils, Absolute 7.50 10*3/mm3      Lymphocytes, Absolute 1.90 10*3/mm3      Monocytes, Absolute 1.00 10*3/mm3      Eosinophils, Absolute 0.20 10*3/mm3      Basophils, Absolute 0.00 10*3/mm3      nRBC 0.0 /100 WBC     COVID PRE-OP / PRE-PROCEDURE SCREENING ORDER (NO ISOLATION) - Swab, Nasopharynx [[id number]]  (Normal) Collected: 02/12/21 1546    Specimen: Swab from Nasopharynx Updated: 02/12/21 1638    Narrative:      The following orders were created for panel order COVID PRE-OP / PRE-PROCEDURE SCREENING ORDER (NO ISOLATION) - Swab, Nasopharynx.  Procedure                               Abnormality         Status                     ---------                               -----------          ------                     COVID-19,CEPHEID,COR/RAN...[[id number]]  Normal              Final result                 Please view results for these tests on the individual orders.    COVID-19,CEPHEID,COR/RAN/PAD IN-HOUSE(OR EMERGENT/ADD-ON),NP SWAB IN TRANSPORT MEDIA 3-4 HR TAT, RT-PCR - Swab, Nasopharynx [[id number]]  (Normal) Collected: 02/12/21 1546    Specimen: Swab from Nasopharynx Updated: 02/12/21 1638     COVID19 Not Detected    Narrative:      Fact sheet for providers: https://www.fda.gov/media/288830/download     Fact sheet for patients: https://www.fda.gov/media/373429/download    Comprehensive Metabolic Panel [[id number]]  (Abnormal) Collected: 02/12/21 1507    Specimen: Blood from Arm, Left Updated: 02/12/21 1533     Glucose 117 mg/dL      BUN 16 mg/dL      Creatinine 0.78 mg/dL      Sodium 138 mmol/L      Potassium 4.1 mmol/L      Chloride 103 mmol/L      CO2 25.0 mmol/L      Calcium 9.8 mg/dL      Total Protein 7.2 g/dL      Albumin 4.00 g/dL      ALT (SGPT) 61 U/L      AST (SGOT) 46 U/L      Alkaline Phosphatase 100 U/L      Total Bilirubin 0.2 mg/dL      eGFR Non African Amer 72 mL/min/1.73      Globulin 3.2 gm/dL      A/G Ratio 1.3 g/dL      BUN/Creatinine Ratio 20.5     Anion Gap 10.0 mmol/L     Narrative:      GFR Normal >60  Chronic Kidney Disease <60  Kidney Failure <15      aPTT [[id number]]  (Normal) Collected: 02/12/21 1508    Specimen: Blood from Arm, Left Updated: 02/12/21 1524     PTT 24.2 seconds     Protime-INR [[id number]]  (Normal) Collected: 02/12/21 1507    Specimen: Blood from Arm, Left Updated: 02/12/21 1524     Protime 10.8 Seconds      INR 0.98    CBC & Differential [[id number]]  (Abnormal) Collected: 02/12/21 1507    Specimen: Blood from Arm, Left Updated: 02/12/21 1515    Narrative:      The following orders were created for panel order CBC & Differential.  Procedure                               Abnormality         Status                     ---------                                -----------         ------                     CBC Auto Differential[[id number]]        Abnormal            Final result                 Please view results for these tests on the individual orders.    CBC Auto Differential [[id number]]  (Abnormal) Collected: 02/12/21 1507    Specimen: Blood from Arm, Left Updated: 02/12/21 1515     WBC 10.80 10*3/mm3      RBC 4.12 10*6/mm3      Hemoglobin 12.4 g/dL      Hematocrit 36.4 %      MCV 88.3 fL      MCH 30.1 pg      MCHC 34.1 g/dL      RDW 13.7 %      RDW-SD 42.4 fl      MPV 9.2 fL      Platelets 277 10*3/mm3      Neutrophil % 76.7 %      Lymphocyte % 13.9 %      Monocyte % 8.0 %      Eosinophil % 1.0 %      Basophil % 0.4 %      Neutrophils, Absolute 8.30 10*3/mm3      Lymphocytes, Absolute 1.50 10*3/mm3      Monocytes, Absolute 0.90 10*3/mm3      Eosinophils, Absolute 0.10 10*3/mm3      Basophils, Absolute 0.00 10*3/mm3      nRBC 0.0 /100 WBC         No results found for: HGBA1C  Results from last 7 days   Lab Units 02/12/21  1507   INR  0.98           No results found for: LIPASE  Lab Results   Component Value Date    CHLPL 195 08/27/2020    TRIG 277 (H) 08/27/2020    HDL 44 08/27/2020    LDL 96 08/27/2020       No results found for: INTRAOP, PREDX, FINALDX, COMDX    Microbiology Results (last 10 days)     Procedure Component Value - Date/Time    COVID PRE-OP / PRE-PROCEDURE SCREENING ORDER (NO ISOLATION) - Swab, Nasopharynx [[id number]]  (Normal) Collected: 02/12/21 1546    Lab Status: Final result Specimen: Swab from Nasopharynx Updated: 02/12/21 1638    Narrative:      The following orders were created for panel order COVID PRE-OP / PRE-PROCEDURE SCREENING ORDER (NO ISOLATION) - Swab, Nasopharynx.  Procedure                               Abnormality         Status                     ---------                               -----------         ------                     COVID-19,CEPHEID,COR/RAN...[[id number]]  Normal              Final result                 Please view  results for these tests on the individual orders.    COVID-19,CEPHEID,COR/RAN/PAD IN-HOUSE(OR EMERGENT/ADD-ON),NP SWAB IN TRANSPORT MEDIA 3-4 HR TAT, RT-PCR - Swab, Nasopharynx [[id number]]  (Normal) Collected: 02/12/21 1546    Lab Status: Final result Specimen: Swab from Nasopharynx Updated: 02/12/21 1638     COVID19 Not Detected    Narrative:      Fact sheet for providers: https://www.fda.gov/media/204648/download     Fact sheet for patients: https://www.fda.gov/media/592854/download          ECG/EMG Results (most recent)     Procedure Component Value Units Date/Time    ECG 12 Lead [[id number]] Collected: 02/12/21 1453     Updated: 02/13/21 0918     QT Interval 375 ms     Narrative:      HEART RATE= 75  bpm  RR Interval= 804  ms  NC Interval= 167  ms  P Horizontal Axis= 0  deg  P Front Axis= 17  deg  QRSD Interval= 90  ms  QT Interval= 375  ms  QRS Axis= -34  deg  T Wave Axis= 4  deg  - BORDERLINE ECG -  Sinus rhythm  Left axis deviation  Low voltage, precordial leads  Consider anterior infarct  No previous ECG available for comparison  Electronically Signed By: Melchor Wiggins (RAN) 13-Feb-2021 09:17:21  Date and Time of Study: 2021-02-12 14:53:48                    Xr Forearm 2 View Right    Result Date: 2/12/2021  Comminuted impacted intra-articular fracture of the right distal radial metaphysis. No gross dislocation.  Electronically Signed By-Shari Rene MD On:2/12/2021 2:24 PM This report was finalized on [id number] by  Shari Rene MD.          Xrays, labs reviewed personally by physician.    Medication Review:   I have reviewed the patient's current medication list      Scheduled Meds  ondansetron, 4 mg, Intravenous, Once  sodium chloride, 10 mL, Intravenous, Q12H        Meds Infusions       Meds PRN  •  acetaminophen **OR** acetaminophen **OR** acetaminophen  •  aluminum-magnesium hydroxide-simethicone  •  HYDROcodone-acetaminophen  •  melatonin  •  Morphine  •  ondansetron **OR** ondansetron  •   [COMPLETED] Insert peripheral IV **AND** sodium chloride  •  sodium chloride        Assessment/Plan   Assessment/Plan     Active Hospital Problems    Diagnosis  POA   • Closed fracture of right radius and ulna [S52.91XA, S52.201A]  Yes      Resolved Hospital Problems   No resolved problems to display.       MEDICAL DECISION MAKING COMPLEXITY BY PROBLEM:     Right radius and ulna fracture  -XR right forearm: comminuted impacted intra-articular fracture of the right distal radial metaphysis.  No gross dislocation.   -splinted in ER  -ortho consulted  -pain control  - Plan for surgery today noted.        VTE Prophylaxis - SCDs    VTE Prophylaxis -   Mechanical Order History:      Ordered        02/12/21 1529  Place Sequential Compression Device  Once         02/12/21 1529  Maintain Sequential Compression Device  Continuous                 Pharmalogical Order History:     None            Code Status -   Code Status and Medical Interventions:   Ordered at: 02/12/21 1530     Level Of Support Discussed With:    Patient     Code Status:    CPR     Medical Interventions (Level of Support Prior to Arrest):    Full       This patient has been examined wearing appropriate Personal Protective Equipment and discussed with hospital infection control department. 02/13/21        Discharge Planning          Electronically signed by Elodia Anne MD, 02/13/21, 10:06 EST.  [facility] Hospitalist Team

## 2021-02-13 NOTE — PLAN OF CARE
Goal Outcome Evaluation:   Patient alert oriented and able to make needs known. Patient ambulates in room with stand by assist. Pain controlled with PO pain medication.

## 2021-02-13 NOTE — ANESTHESIA PROCEDURE NOTES
Airway  Urgency: elective    Date/Time: 2/13/2021 11:57 AM  End Time:2/13/2021 12:02 PM    General Information and Staff    Patient location during procedure: OR    Indications and Patient Condition  Indications for airway management: airway protection    Preoxygenated: yes  Mask difficulty assessment: 2 - vent by mask + OA or adjuvant +/- NMBA    Final Airway Details  Final airway type: endotracheal airway      Successful airway: ETT  Cuffed: yes   Successful intubation technique: direct laryngoscopy  Facilitating devices/methods: Bougie  Endotracheal tube insertion site: oral  Blade: Tram  Blade size: 3  ETT size (mm): 7.0  Cormack-Lehane Classification: grade IIb - view of arytenoids or posterior of glottis only  Measured from: lips  ETT/EBT  to lips (cm): 21  Number of attempts at approach: 2  Assessment: lips, teeth, and gum same as pre-op and atraumatic intubation    Additional Comments  ETT placed because neither 4 nor 3 LMA would seal properly.  Needed an Eschmann.

## 2021-02-13 NOTE — ANESTHESIA POSTPROCEDURE EVALUATION
Patient: Judith Sauceda    Procedure Summary     Date: 02/13/21 Room / Location: [facility] OR 11 / [facility] MAIN OR    Anesthesia Start: 1147 Anesthesia Stop: 1305    Procedure: OPEN REDUCTION INTERNAL FIXATION DISTAL RADIUS (Right ) Diagnosis:       Closed fracture of right radius and ulna, initial encounter      (Closed fracture of right radius and ulna, initial encounter [S52.91XA, S52.201A])    Surgeon: Travis Francois MD Provider: Neetu Fagan MD    Anesthesia Type: general with block ASA Status: 2          Anesthesia Type: general with block    Vitals  Vitals Value Taken Time   /70 02/13/21 1400   Temp 99.1 °F (37.3 °C) 02/13/21 1400   Pulse 73 02/13/21 1402   Resp 20 02/13/21 1400   SpO2 93 % 02/13/21 1402   Vitals shown include unvalidated device data.        Post Anesthesia Care and Evaluation    Patient location during evaluation: PACU  Patient participation: complete - patient participated  Level of consciousness: awake  Pain scale: See nurse's notes for pain score.  Pain management: adequate  Airway patency: patent  Anesthetic complications: No anesthetic complications  PONV Status: none  Cardiovascular status: acceptable  Respiratory status: acceptable  Hydration status: acceptable    Comments: Patient seen and examined postoperatively; vital signs stable; SpO2 greater than or equal to 90%; cardiopulmonary status stable; nausea/vomiting adequately controlled; pain adequately controlled; no apparent anesthesia complications; patient discharged from anesthesia care when discharge criteria were met

## 2021-02-13 NOTE — PLAN OF CARE
Goal Outcome Evaluation:  Plan of Care Reviewed With: patient  Progress: improving  Outcome Summary: ORIF of the distal radius today. May discharge to home later today

## 2021-02-13 NOTE — OUTREACH NOTE
Prep Survey      Responses   [facility] patient discharged from?  [facility]   Is LACE score < 7 ?  Yes   Emergency Room discharge w/ pulse ox?  No   Eligibility  [facility]   Date of Admission  02/12/21   Date of Discharge  02/13/21   Discharge Disposition  Home or Self Care   Discharge diagnosis  OPEN REDUCTION INTERNAL FIXATION DISTAL RADIUS-Right   Does the patient have one of the following disease processes/diagnoses(primary or secondary)?  General Surgery   Does the patient have Home health ordered?  No   Is there a DME ordered?  No   Prep survey completed?  Yes          Lorenza Potter RN

## 2021-02-13 NOTE — DISCHARGE SUMMARY
Discharge summary  This lady is a 75-year-old right-handed female who yesterday fell on the ice.  No other injuries other than to her right wrist.  No loss of consciousness neck or back problems.  She was taken to surgery with surgery was performed according the operative note.  Postoperatively she did well.  She was again able to get up and move around independently after the surgery.  He was tolerating clear liquids prior to discharge.  She was discharged home on all her prehospitalization medications as well as hydrocodone 10 1 p.o. every 4 hours as needed pain and was able to change her dressing the day after surgery.  She can get her wound wet in 1 week and was to be seen in the office in 2 weeks or sooner if she had problems.

## 2021-02-13 NOTE — OP NOTE
Preoperative diagnosis comminuted intra-articular dominant right arm Colles' fracture with greater than 3 parts  Postoperative diagnosis same  Surgeon Dr. Travis Francois  Anesthesia General plus regional by Dr. Pinedaations drains transfusions none estimated blood loss less than 10 cc tourniquet time at 250 mmHg was 40 minutes.    Operation performed open reduction internal fixation of right distal radius    Indications this patient is a 75-year-old female who fell on the ice yesterday.  Diagnosis and treatment plan as well as risks and potential complications were discussed with her that include but not exclusive to continued pain infection nerve damage or damage need for reoperation blood clots she understood these and still desired him to do the procedure.    Procedure the patient was taken the operating room where she was given a general anesthesia prior to which she was given a regional block and her right upper extremity was prepped and draped in usual sterile fashion.  An anterior longitudinal incision was made over the distal radius.  Skin was dissected and soft tissue and vascular structure retractor harm's way hemocoagulations obtained throughout the procedure Bovie cauterization.  The pronator quadratus was elevated over the distal radius the fracture site was identified and reduced and secured with a Upton distal radial plate.  X-rays were taken which showed excellent reduction of the fracture good placement of the plate and screws.  Following this the wound was washed out with copious amounts of sterile saline the subcu was closed with interrupted sutures as 4-0 Vicryl and the skin was closed with staples covered with Telfa fluffs and a splint.  The needle and sponge count was correct at the end of the procedure the patient tolerated the procedure well was taken to the recovery in satisfactory condition.  She was sent home with hydrocodone 10 to be seen in the office in 2 weeks or sooner if she  had problems.

## 2021-02-15 ENCOUNTER — TRANSITIONAL CARE MANAGEMENT TELEPHONE ENCOUNTER (OUTPATIENT)
Dept: CALL CENTER | Facility: HOSPITAL | Age: 76
End: 2021-02-15

## 2021-02-15 ENCOUNTER — TELEPHONE (OUTPATIENT)
Dept: FAMILY MEDICINE CLINIC | Facility: CLINIC | Age: 76
End: 2021-02-15

## 2021-02-15 NOTE — OUTREACH NOTE
Call Center TCM Note      Responses   [facility] patient discharged from?  [facility]   Does the patient have one of the following disease processes/diagnoses(primary or secondary)?  General Surgery   TCM attempt successful?  No   Unsuccessful attempts  Attempt 1          Caridad Tuttle MA    2/15/2021, 14:13 EST

## 2021-02-15 NOTE — PROGRESS NOTES
Case Management Discharge Note      Final Note: Routine discharge home per MD note.         Selected Continued Care - Discharged on 2/13/2021 Admission date: 2/12/2021 - Discharge disposition: Home or Self Care                 Final Discharge Disposition Code: 01 - home or self-care

## 2021-02-15 NOTE — OUTREACH NOTE
Call Center TCM Note      Responses   [facility]   Does the patient have one of the following disease processes/diagnoses(primary or secondary)?  General Surgery   TCM attempt successful?  No [Pt and Carmen]   Unsuccessful attempts  Attempt 2          Kim Cespedes RN    2/15/2021, 15:53 EST

## 2021-02-16 ENCOUNTER — TRANSITIONAL CARE MANAGEMENT TELEPHONE ENCOUNTER (OUTPATIENT)
Dept: CALL CENTER | Facility: HOSPITAL | Age: 76
End: 2021-02-16

## 2021-02-16 NOTE — OUTREACH NOTE
Call Center TCM Note      Responses   [facility] patient discharged from?  [facility]   Does the patient have one of the following disease processes/diagnoses(primary or secondary)?  General Surgery   TCM attempt successful?  No   Unsuccessful attempts  Attempt 3   Wrap up additional comments  Unable to reach pt x 3 for TCM call. Pt is not sched for TCM FWP with PCP. Pt is s/p ORIF dist rad fx          Caridad Tuttle MA    2/16/2021, 16:26 EST

## 2021-06-09 ENCOUNTER — TELEPHONE (OUTPATIENT)
Dept: FAMILY MEDICINE CLINIC | Facility: CLINIC | Age: 76
End: 2021-06-09

## 2021-06-09 NOTE — TELEPHONE ENCOUNTER
Caller: Judith Sauceda    Relationship: Self    Best call back number: [phone]    What medication are you requesting: ANYTHING    What are your current symptoms: CHILL, LEGS HURT    How long have you been experiencing symptoms: LAST NIGHT     Have you had these symptoms before:    [] Yes  [x] No    Have you been treated for these symptoms before:   [] Yes  [x] No    If a prescription is needed, what is your preferred pharmacy and phone number:    [id number] - [address] [facility] [address] [facility] [address] [facility] [address] - [phone] [facility] [phone]    Additional notes:  PATIENT THINKS SHE MAY HAVE PNEUMONIA AND WOULD LIKE SOMETHING CALLED IN FOR HER. PLEASE ADVISE

## 2022-10-04 PROBLEM — H35.3130 BILATERAL NONEXUDATIVE AGE-RELATED MACULAR DEGENERATION: Status: ACTIVE | Noted: 2022-10-04

## 2022-10-04 PROBLEM — M85.80 OSTEOPENIA: Status: ACTIVE | Noted: 2022-10-04

## 2022-10-05 ENCOUNTER — OFFICE VISIT (OUTPATIENT)
Dept: FAMILY MEDICINE CLINIC | Facility: CLINIC | Age: 77
End: 2022-10-05

## 2022-10-05 VITALS
OXYGEN SATURATION: 96 % | WEIGHT: 205 LBS | RESPIRATION RATE: 16 BRPM | SYSTOLIC BLOOD PRESSURE: 165 MMHG | HEIGHT: 67 IN | TEMPERATURE: 97.2 F | DIASTOLIC BLOOD PRESSURE: 98 MMHG | BODY MASS INDEX: 32.18 KG/M2 | HEART RATE: 73 BPM

## 2022-10-05 DIAGNOSIS — K59.09 CHRONIC CONSTIPATION: ICD-10-CM

## 2022-10-05 DIAGNOSIS — Z23 PNEUMOCOCCAL VACCINE ADMINISTERED: ICD-10-CM

## 2022-10-05 DIAGNOSIS — H35.3130 BILATERAL NONEXUDATIVE AGE-RELATED MACULAR DEGENERATION, UNSPECIFIED STAGE: ICD-10-CM

## 2022-10-05 DIAGNOSIS — Z23 FLU VACCINE NEED: ICD-10-CM

## 2022-10-05 DIAGNOSIS — M85.80 OSTEOPENIA, UNSPECIFIED LOCATION: ICD-10-CM

## 2022-10-05 DIAGNOSIS — E78.5 DYSLIPIDEMIA: ICD-10-CM

## 2022-10-05 DIAGNOSIS — Z13.89 ENCOUNTER FOR SCREENING FOR OTHER DISORDER: ICD-10-CM

## 2022-10-05 DIAGNOSIS — I10 ESSENTIAL HYPERTENSION: Primary | ICD-10-CM

## 2022-10-05 DIAGNOSIS — R73.09 ELEVATED GLUCOSE: ICD-10-CM

## 2022-10-05 DIAGNOSIS — Z78.0 POST-MENOPAUSE: ICD-10-CM

## 2022-10-05 DIAGNOSIS — R53.83 OTHER FATIGUE: ICD-10-CM

## 2022-10-05 DIAGNOSIS — E66.09 CLASS 1 OBESITY DUE TO EXCESS CALORIES WITH SERIOUS COMORBIDITY AND BODY MASS INDEX (BMI) OF 32.0 TO 32.9 IN ADULT: ICD-10-CM

## 2022-10-05 PROBLEM — E66.811 CLASS 1 OBESITY DUE TO EXCESS CALORIES WITH SERIOUS COMORBIDITY AND BODY MASS INDEX (BMI) OF 32.0 TO 32.9 IN ADULT: Status: ACTIVE | Noted: 2022-10-05

## 2022-10-05 PROCEDURE — 90677 PCV20 VACCINE IM: CPT | Performed by: NURSE PRACTITIONER

## 2022-10-05 PROCEDURE — G0009 ADMIN PNEUMOCOCCAL VACCINE: HCPCS | Performed by: NURSE PRACTITIONER

## 2022-10-05 PROCEDURE — 90662 IIV NO PRSV INCREASED AG IM: CPT | Performed by: NURSE PRACTITIONER

## 2022-10-05 PROCEDURE — 99214 OFFICE O/P EST MOD 30 MIN: CPT | Performed by: NURSE PRACTITIONER

## 2022-10-05 PROCEDURE — G0008 ADMIN INFLUENZA VIRUS VAC: HCPCS | Performed by: NURSE PRACTITIONER

## 2022-10-05 RX ORDER — LISINOPRIL 10 MG/1
10 TABLET ORAL DAILY
Qty: 30 TABLET | Refills: 3 | Status: SHIPPED | OUTPATIENT
Start: 2022-10-05 | End: 2022-11-18

## 2022-10-05 RX ORDER — BISACODYL 5 MG/1
5 TABLET, DELAYED RELEASE ORAL DAILY PRN
COMMUNITY

## 2022-10-05 RX ORDER — ACETAMINOPHEN 500 MG
500 TABLET ORAL EVERY 6 HOURS PRN
COMMUNITY

## 2022-10-05 NOTE — PROGRESS NOTES
Chief Complaint  Hypertension    Subjective          Judith Sauceda presents to [facility] for     History of Present Illness  Judith is a 77-year-old female who presents to the office today to establish care with me.  Previous PCP Dr. Galindo.  Her last visit with Dr. Galindo was in August 2020.    Patient presents for follow-up of HTN  This is an established problem  Current medications: none  She states she has never been prescribed medication for her blood pressure.  She does not check her blood pressure at home.    Patient presents for follow-up of osteopenia  This is an established problem  DEXA scan on 8/27/2020  Current medications: calcium    Follow up dyslipidemia  Last lipid panel 8/2020 and triglycerides were elevated.    Follow-up chronic constipation.  She takes bisacodyl 5 mg daily.                 Judith Sauceda  has a past medical history of Bilateral nonexudative age-related macular degeneration, Constipation, Hypertension, and Osteoporosis.      Review of Systems   Constitutional: Positive for fatigue. Negative for fever.   HENT: Negative for congestion, ear pain, sore throat, trouble swallowing and voice change.    Eyes: Negative for discharge, itching and visual disturbance.   Respiratory: Positive for shortness of breath (with exertion; up and down steps). Negative for cough.    Cardiovascular: Negative for chest pain, palpitations and leg swelling.   Gastrointestinal: Positive for constipation. Negative for abdominal distention, abdominal pain, blood in stool, diarrhea, nausea and vomiting.        No heartburn   Endocrine: Negative for cold intolerance, heat intolerance, polydipsia, polyphagia and polyuria.   Genitourinary: Negative for difficulty urinating, dysuria, flank pain, hematuria and vaginal bleeding.   Musculoskeletal: Positive for back pain. Negative for arthralgias and myalgias.   Skin: Negative for rash.   Allergic/Immunologic: Positive for environmental  allergies.   Neurological: Negative for dizziness, light-headedness and headaches.   Hematological: Negative for adenopathy. Does not bruise/bleed easily.   Psychiatric/Behavioral: Positive for sleep disturbance (takes melatonin). Negative for dysphoric mood. The patient is not nervous/anxious.         Family History   Problem Relation Age of Onset   • Heart disease Mother    • Osteoporosis Mother    • Hip fracture Mother    • Ulcers Mother    • Kidney cancer Sister    • Parkinsonism Sister         Past Surgical History:   Procedure Laterality Date   • CATARACT EXTRACTION      march 3 and may 1 2020   • WRIST FRACTURE SURGERY Right 2/13/2021    Procedure: OPEN REDUCTION INTERNAL FIXATION DISTAL RADIUS;  Surgeon: Travis Francois MD;  Location: [facility] MAIN OR;  Service: Orthopedics;  Laterality: Right;        Social History     Socioeconomic History   • Marital status:    Tobacco Use   • Smoking status: Never Smoker   • Smokeless tobacco: Never Used   Vaping Use   • Vaping Use: Never used   Substance and Sexual Activity   • Alcohol use: Not Currently     Comment: rarely   • Drug use: Never   • Sexual activity: Defer        Objective       Current Outpatient Medications:   •  acetaminophen (TYLENOL) 500 MG tablet, Take 500 mg by mouth Every 6 (Six) Hours As Needed., Disp: , Rfl:   •  Apple Cider Vinegar 300 MG tablet, Take 2 tablets by mouth 2 (Two) Times a Day., Disp: , Rfl:   •  bisacodyl (DULCOLAX) 5 MG EC tablet, Take 5 mg by mouth Daily As Needed., Disp: , Rfl:   •  calcium carbonate (OS-BETHANY) 600 MG tablet, Take 600 mg by mouth 2 (Two) Times a Day With Meals., Disp: , Rfl:   •  ibuprofen (ADVIL,MOTRIN) 200 MG tablet, Take 200 mg by mouth Every 6 (Six) Hours As Needed for Mild Pain ., Disp: , Rfl:   •  melatonin 1 MG tablet, Take 2 mg by mouth Every Night., Disp: , Rfl:   •  Multiple Vitamins-Minerals (PRESERVISION AREDS 2 PO), Take 1 tablet by mouth Daily., Disp: , Rfl:   •  Psyllium (METAMUCIL FIBER PO),  "Take  by mouth., Disp: , Rfl:   •  lisinopril (PRINIVIL,ZESTRIL) 10 MG tablet, Take 1 tablet by mouth Daily. For blood pressure, Disp: 30 tablet, Rfl: 3    Vital Signs:      /98 (BP Location: Right arm, Patient Position: Sitting, Cuff Size: Adult)   Pulse 73   Temp 97.2 °F (36.2 °C) (Infrared)   Resp 16   Ht 170.2 cm (67\")   Wt 93 kg (205 lb)   SpO2 96%   BMI 32.11 kg/m²     Vitals:    10/05/22 1052   BP: 165/98   BP Location: Right arm   Patient Position: Sitting   Cuff Size: Adult   Pulse: 73   Resp: 16   Temp: 97.2 °F (36.2 °C)   TempSrc: Infrared   SpO2: 96%   Weight: 93 kg (205 lb)   Height: 170.2 cm (67\")      Physical Exam  Vitals reviewed.   Constitutional:       General: She is not in acute distress.     Appearance: Normal appearance. She is obese.   HENT:      Head: Normocephalic and atraumatic.      Right Ear: Tympanic membrane, ear canal and external ear normal.      Left Ear: External ear normal. There is impacted cerumen.      Nose: Nose normal.      Mouth/Throat:      Mouth: Mucous membranes are moist.      Pharynx: Oropharynx is clear.   Eyes:      General: No scleral icterus.     Conjunctiva/sclera: Conjunctivae normal.   Neck:      Thyroid: No thyromegaly.   Cardiovascular:      Rate and Rhythm: Normal rate and regular rhythm.      Heart sounds: Normal heart sounds.   Pulmonary:      Effort: Pulmonary effort is normal. No respiratory distress.      Breath sounds: Normal breath sounds. No wheezing.   Abdominal:      General: Bowel sounds are normal.      Palpations: Abdomen is soft. There is no mass.      Tenderness: There is no abdominal tenderness. There is no guarding or rebound.   Musculoskeletal:      Cervical back: Neck supple.      Right lower leg: No edema.      Left lower leg: No edema.   Lymphadenopathy:      Cervical: No cervical adenopathy.   Skin:     General: Skin is warm and dry.   Neurological:      Mental Status: She is alert and oriented to person, place, and time. "   Psychiatric:         Mood and Affect: Mood normal.        Result Review :                            Assessment and Plan    Diagnoses and all orders for this visit:    1. Essential hypertension (Primary)  Assessment & Plan:  Begin lisinopril 10 mg daily. Discussed goal systolic blood pressure is less than 120 and goal diastolic is less than 80. Discussed non-pharmacologic treatment options to lower blood pressure: low-sodium diet, weight loss, exercise 10 to 15 minutes/day and consumption of alcohol in moderation.  Advised to monitor blood pressure at home and bring a copy of readings to next appointment.    Orders:  -     lisinopril (PRINIVIL,ZESTRIL) 10 MG tablet; Take 1 tablet by mouth Daily. For blood pressure  Dispense: 30 tablet; Refill: 3  -     Comprehensive Metabolic Panel    2. Osteopenia, unspecified location  Assessment & Plan:  Will repeat a DEXA scan.  Continue daily calcium and vitamin D supplements.    Orders:  -     CBC Auto Differential  -     Vitamin D 1,25 Dihydroxy    3. Dyslipidemia  Assessment & Plan:  Check a fasting lipid panel    Orders:  -     Lipid Panel    4. Bilateral nonexudative age-related macular degeneration, unspecified stage  Assessment & Plan:  Continue follow-up with your eye doctor, Dr. Auguste.       5. Chronic constipation  Assessment & Plan:  Begin over-the-counter MiraLAX daily.  High-fiber diet.      6. Elevated glucose  Comments:  Her glucose has been elevated on prior metabolic panels.  Will check a hemoglobin A1c  Orders:  -     Hemoglobin A1c    7. Class 1 obesity due to excess calories with serious comorbidity and body mass index (BMI) of 32.0 to 32.9 in adult  Assessment & Plan:  Patient's (Body mass index is 32.11 kg/m².) indicates that they are obese (BMI >30) with health conditions that include hypertension . Weight is unchanged. BMI  is above average; BMI management plan is completed. We discussed portion control and increasing exercise.       8. Flu vaccine  need  -     Fluzone High-Dose 65+yrs    9. Pneumococcal vaccine administered  -     Pneumococcal Conjugate Vaccine 20-Valent All    10. Other fatigue  -     TSH Rfx On Abnormal To Free T4    11. Encounter for screening for other disorder  -     Hepatitis C Antibody    12. Post-menopause  -     DEXA Bone Density Axial           Follow Up   Return in about 6 weeks (around 11/16/2022) for Medicare Wellness, follow up HTN.  Patient was given instructions and counseling regarding her condition or for health maintenance advice. Please see specific information pulled into the AVS if appropriate.    Patient Instructions   Follow up by phone/portal (My Chart) when lab results received.

## 2022-10-06 NOTE — ASSESSMENT & PLAN NOTE
Begin lisinopril 10 mg daily. Discussed goal systolic blood pressure is less than 120 and goal diastolic is less than 80. Discussed non-pharmacologic treatment options to lower blood pressure: low-sodium diet, weight loss, exercise 10 to 15 minutes/day and consumption of alcohol in moderation.  Advised to monitor blood pressure at home and bring a copy of readings to next appointment.

## 2022-10-06 NOTE — ASSESSMENT & PLAN NOTE
Patient's (Body mass index is 32.11 kg/m².) indicates that they are obese (BMI >30) with health conditions that include hypertension . Weight is unchanged. BMI  is above average; BMI management plan is completed. We discussed portion control and increasing exercise.

## 2022-10-07 LAB
1,25(OH)2D SERPL-MCNC: 28.4 PG/ML (ref 24.8–81.5)
ALBUMIN SERPL-MCNC: 4.5 G/DL (ref 3.7–4.7)
ALBUMIN/GLOB SERPL: 1.7 {RATIO} (ref 1.2–2.2)
ALP SERPL-CCNC: 80 IU/L (ref 44–121)
ALT SERPL-CCNC: 18 IU/L (ref 0–32)
AST SERPL-CCNC: 17 IU/L (ref 0–40)
BASOPHILS # BLD AUTO: 0.1 X10E3/UL (ref 0–0.2)
BASOPHILS NFR BLD AUTO: 1 %
BILIRUB SERPL-MCNC: 0.3 MG/DL (ref 0–1.2)
BUN SERPL-MCNC: 16 MG/DL (ref 8–27)
BUN/CREAT SERPL: 21 (ref 12–28)
CALCIUM SERPL-MCNC: 10.5 MG/DL (ref 8.7–10.3)
CHLORIDE SERPL-SCNC: 103 MMOL/L (ref 96–106)
CHOLEST SERPL-MCNC: 208 MG/DL (ref 100–199)
CO2 SERPL-SCNC: 23 MMOL/L (ref 20–29)
CREAT SERPL-MCNC: 0.77 MG/DL (ref 0.57–1)
EGFRCR SERPLBLD CKD-EPI 2021: 79 ML/MIN/1.73
EOSINOPHIL # BLD AUTO: 0.3 X10E3/UL (ref 0–0.4)
EOSINOPHIL NFR BLD AUTO: 3 %
ERYTHROCYTE [DISTWIDTH] IN BLOOD BY AUTOMATED COUNT: 13.1 % (ref 11.7–15.4)
GLOBULIN SER CALC-MCNC: 2.6 G/DL (ref 1.5–4.5)
GLUCOSE SERPL-MCNC: 104 MG/DL (ref 70–99)
HBA1C MFR BLD: 6.2 % (ref 4.8–5.6)
HCT VFR BLD AUTO: 42.7 % (ref 34–46.6)
HCV AB S/CO SERPL IA: <0.1 S/CO RATIO (ref 0–0.9)
HDLC SERPL-MCNC: 47 MG/DL
HGB BLD-MCNC: 14 G/DL (ref 11.1–15.9)
IMM GRANULOCYTES # BLD AUTO: 0 X10E3/UL (ref 0–0.1)
IMM GRANULOCYTES NFR BLD AUTO: 0 %
LDLC SERPL CALC-MCNC: 129 MG/DL (ref 0–99)
LYMPHOCYTES # BLD AUTO: 2.5 X10E3/UL (ref 0.7–3.1)
LYMPHOCYTES NFR BLD AUTO: 26 %
MCH RBC QN AUTO: 28.9 PG (ref 26.6–33)
MCHC RBC AUTO-ENTMCNC: 32.8 G/DL (ref 31.5–35.7)
MCV RBC AUTO: 88 FL (ref 79–97)
MONOCYTES # BLD AUTO: 0.7 X10E3/UL (ref 0.1–0.9)
MONOCYTES NFR BLD AUTO: 8 %
NEUTROPHILS # BLD AUTO: 6.1 X10E3/UL (ref 1.4–7)
NEUTROPHILS NFR BLD AUTO: 62 %
PLATELET # BLD AUTO: 327 X10E3/UL (ref 150–450)
POTASSIUM SERPL-SCNC: 5 MMOL/L (ref 3.5–5.2)
PROT SERPL-MCNC: 7.1 G/DL (ref 6–8.5)
RBC # BLD AUTO: 4.84 X10E6/UL (ref 3.77–5.28)
SODIUM SERPL-SCNC: 141 MMOL/L (ref 134–144)
TRIGL SERPL-MCNC: 182 MG/DL (ref 0–149)
TSH SERPL DL<=0.005 MIU/L-ACNC: 2.38 UIU/ML (ref 0.45–4.5)
VLDLC SERPL CALC-MCNC: 32 MG/DL (ref 5–40)
WBC # BLD AUTO: 9.7 X10E3/UL (ref 3.4–10.8)

## 2022-10-09 PROBLEM — R73.03 PREDIABETES: Status: ACTIVE | Noted: 2022-10-09

## 2022-10-09 NOTE — PROGRESS NOTES
Let her know:  1.  CMP -glucose was 104, kidney function is good, liver enzymes are normal.  Calcium is a little elevated we will recheck in a few months.  2.  Lipid panel -total cholesterol, triglycerides and LDL (bad) cholesterol are elevated.  Her estimated 10-year risk to develop cardiovascular disease is elevated.  I recommend that she begin a medicine to lower her cholesterol.  Let me know if she is willing and I will send a prescription to the pharmacy.  3.  TSH -thyroid function is normal  4.  Vitamin D is 28.4.  I would like to see her vitamin D over 30.  Begin an over-the-counter vitamin D supplement of 2000 IU daily.  5.  Hepatitis C screening is negative  6.  HgbA1c is 6.2% which is prediabetes.  She needs to follow a low-carb, low concentrated sweet diet, exercise and work on weight reduction.  7.  CBC is normal  8.  Keep appointment on 11/18/2022 and we can discuss her lab results and follow-up on her hypertension.

## 2022-10-10 ENCOUNTER — TELEPHONE (OUTPATIENT)
Dept: FAMILY MEDICINE CLINIC | Facility: CLINIC | Age: 77
End: 2022-10-10

## 2022-10-10 NOTE — TELEPHONE ENCOUNTER
Caller: Sauceda Judith    Relationship: Self    Best call back number: [phone]    What medications are you currently taking:   Current Outpatient Medications on File Prior to Visit   Medication Sig Dispense Refill   • acetaminophen (TYLENOL) 500 MG tablet Take 500 mg by mouth Every 6 (Six) Hours As Needed.     • Apple Cider Vinegar 300 MG tablet Take 2 tablets by mouth 2 (Two) Times a Day.     • bisacodyl (DULCOLAX) 5 MG EC tablet Take 5 mg by mouth Daily As Needed.     • calcium carbonate (OS-BETHNAY) 600 MG tablet Take 600 mg by mouth 2 (Two) Times a Day With Meals.     • ibuprofen (ADVIL,MOTRIN) 200 MG tablet Take 200 mg by mouth Every 6 (Six) Hours As Needed for Mild Pain .     • lisinopril (PRINIVIL,ZESTRIL) 10 MG tablet Take 1 tablet by mouth Daily. For blood pressure 30 tablet 3   • melatonin 1 MG tablet Take 2 mg by mouth Every Night.     • Multiple Vitamins-Minerals (PRESERVISION AREDS 2 PO) Take 1 tablet by mouth Daily.     • Psyllium (METAMUCIL FIBER PO) Take  by mouth.       No current facility-administered medications on file prior to visit.        Which medication are you concerned about: LISINOPRIL    What are your concerns: PATIENT DOES NOT KNOW WHAT MEDICATION IS USED FOR. REQUESTING A CALL TO DISCUSS

## 2022-10-11 ENCOUNTER — HOSPITAL ENCOUNTER (OUTPATIENT)
Dept: BONE DENSITY | Facility: HOSPITAL | Age: 77
Discharge: HOME | End: 2022-10-11
Admitting: NURSE PRACTITIONER

## 2022-10-11 PROCEDURE — 77080 DXA BONE DENSITY AXIAL: CPT

## 2022-10-12 ENCOUNTER — TELEPHONE (OUTPATIENT)
Dept: FAMILY MEDICINE CLINIC | Facility: CLINIC | Age: 77
End: 2022-10-12

## 2022-10-12 NOTE — TELEPHONE ENCOUNTER
Patient returned our call and was advised on her blood work. She agrees to starting the cholesterol medication and I did let her know it would be sent into the pharmacy for her.

## 2022-10-13 DIAGNOSIS — E78.5 DYSLIPIDEMIA: Primary | ICD-10-CM

## 2022-10-13 RX ORDER — ATORVASTATIN CALCIUM 20 MG/1
20 TABLET, FILM COATED ORAL DAILY
Qty: 30 TABLET | Refills: 3 | Status: SHIPPED | OUTPATIENT
Start: 2022-10-13 | End: 2023-04-03 | Stop reason: SDUPTHER

## 2022-11-18 ENCOUNTER — OFFICE VISIT (OUTPATIENT)
Dept: FAMILY MEDICINE CLINIC | Facility: CLINIC | Age: 77
End: 2022-11-18

## 2022-11-18 VITALS
OXYGEN SATURATION: 96 % | TEMPERATURE: 96.7 F | BODY MASS INDEX: 31.64 KG/M2 | WEIGHT: 201.6 LBS | RESPIRATION RATE: 16 BRPM | DIASTOLIC BLOOD PRESSURE: 80 MMHG | SYSTOLIC BLOOD PRESSURE: 133 MMHG | HEART RATE: 63 BPM | HEIGHT: 67 IN

## 2022-11-18 DIAGNOSIS — Z00.00 MEDICARE ANNUAL WELLNESS VISIT, SUBSEQUENT: Primary | ICD-10-CM

## 2022-11-18 DIAGNOSIS — E78.5 DYSLIPIDEMIA: ICD-10-CM

## 2022-11-18 DIAGNOSIS — I10 PRIMARY HYPERTENSION: ICD-10-CM

## 2022-11-18 DIAGNOSIS — M85.80 OSTEOPENIA, UNSPECIFIED LOCATION: ICD-10-CM

## 2022-11-18 DIAGNOSIS — R73.03 PREDIABETES: ICD-10-CM

## 2022-11-18 DIAGNOSIS — Z53.20 MAMMOGRAM DECLINED: ICD-10-CM

## 2022-11-18 DIAGNOSIS — E55.9 VITAMIN D DEFICIENCY: ICD-10-CM

## 2022-11-18 PROCEDURE — G0439 PPPS, SUBSEQ VISIT: HCPCS | Performed by: NURSE PRACTITIONER

## 2022-11-18 PROCEDURE — 99214 OFFICE O/P EST MOD 30 MIN: CPT | Performed by: NURSE PRACTITIONER

## 2022-11-18 PROCEDURE — 1159F MED LIST DOCD IN RCRD: CPT | Performed by: NURSE PRACTITIONER

## 2022-11-18 RX ORDER — LOSARTAN POTASSIUM 50 MG/1
50 TABLET ORAL DAILY
Qty: 30 TABLET | Refills: 2 | Status: SHIPPED | OUTPATIENT
Start: 2022-11-18 | End: 2023-02-08 | Stop reason: SDUPTHER

## 2022-11-18 NOTE — PROGRESS NOTES
The ABCs of the Annual Wellness Visit  Subsequent Medicare Wellness Visit    Chief Complaint   Patient presents with   • Medicare Wellness-subsequent   • Hypertension   • Hyperlipidemia   • Prediabetes   • Vitamin D Deficiency      Subjective    History of Present Illness:  Judith Sauceda is a 77 y.o. female who presents for a Subsequent Medicare Wellness Visit.    The following portions of the patient's history were reviewed and   updated as appropriate: allergies, current medications, past family history, past medical history, past social history, past surgical history and problem list.    Compared to one year ago, the patient feels her physical   health is the same.    Compared to one year ago, the patient feels her mental   health is the same.    Recent Hospitalizations:  She was not admitted to the hospital during the last year.       Current Medical Providers:  Patient Care Team:  April Soares APRN as PCP - General (Family Medicine)    Outpatient Medications Prior to Visit   Medication Sig Dispense Refill   • acetaminophen (TYLENOL) 500 MG tablet Take 500 mg by mouth Every 6 (Six) Hours As Needed.     • atorvastatin (LIPITOR) 20 MG tablet Take 1 tablet by mouth Daily. 30 tablet 3   • bisacodyl (DULCOLAX) 5 MG EC tablet Take 5 mg by mouth Daily As Needed.     • calcium carbonate (OS-BETHANY) 600 MG tablet Take 600 mg by mouth 2 (Two) Times a Day With Meals.     • cholecalciferol (VITAMIN D3) 25 MCG (1000 UT) tablet Take 1,000 Units by mouth Daily.     • melatonin 1 MG tablet Take 2 mg by mouth Every Night.     • Multiple Vitamins-Minerals (PRESERVISION AREDS 2 PO) Take 1 tablet by mouth Daily.     • Psyllium (METAMUCIL FIBER PO) Take  by mouth.     • Apple Cider Vinegar 300 MG tablet Take 2 tablets by mouth 2 (Two) Times a Day.     • ibuprofen (ADVIL,MOTRIN) 200 MG tablet Take 200 mg by mouth Every 6 (Six) Hours As Needed for Mild Pain .     • lisinopril (PRINIVIL,ZESTRIL) 10 MG tablet Take 1 tablet by mouth  "Daily. For blood pressure 30 tablet 3     No facility-administered medications prior to visit.       No opioid medication identified on active medication list. I have reviewed chart for other potential  high risk medication/s and harmful drug interactions in the elderly.          Aspirin is not on active medication list.  Aspirin use is not indicated based on review of current medical condition/s. Risk of harm outweighs potential benefits.  .    Patient Active Problem List   Diagnosis   • Acute pain of left knee   • Shortness of breath   • Hypertension   • Medicare annual wellness visit, subsequent   • Closed fracture of right radius and ulna   • Osteopenia   • Bilateral nonexudative age-related macular degeneration   • Dyslipidemia   • Chronic constipation   • Class 1 obesity due to excess calories with serious comorbidity and body mass index (BMI) of 32.0 to 32.9 in adult   • Prediabetes     Advance Care Planning  Advance Directive is on file.  ACP discussion was held with the patient during this visit. Patient has an advance directive in EMR which is still valid.           Objective    Vitals:    11/18/22 1055   BP: 133/80   BP Location: Right arm   Patient Position: Sitting   Cuff Size: Adult   Pulse: 63   Resp: 16   Temp: 96.7 °F (35.9 °C)   TempSrc: Infrared   SpO2: 96%   Weight: 91.4 kg (201 lb 9.6 oz)   Height: 170.2 cm (67\")     Estimated body mass index is 31.58 kg/m² as calculated from the following:    Height as of this encounter: 170.2 cm (67\").    Weight as of this encounter: 91.4 kg (201 lb 9.6 oz).    BMI is >= 30 and <35. (Class 1 Obesity). The following options were offered after discussion;: weight loss educational material (shared in after visit summary) and nutrition counseling/recommendations      Does the patient have evidence of cognitive impairment? No    Physical Exam  Lab Results   Component Value Date    CHLPL 208 (H) 10/05/2022    TRIG 182 (H) 10/05/2022    HDL 47 10/05/2022     " (H) 10/05/2022    VLDL 32 10/05/2022    HGBA1C 6.2 (H) 10/05/2022            HEALTH RISK ASSESSMENT    Smoking Status:  Social History     Tobacco Use   Smoking Status Never   Smokeless Tobacco Never     Alcohol Consumption:  Social History     Substance and Sexual Activity   Alcohol Use Not Currently    Comment: rarely     Fall Risk Screen:    DIAN Fall Risk Assessment was completed, and patient is at LOW risk for falls.Assessment completed on:11/18/2022    Depression Screening:  PHQ-2/PHQ-9 Depression Screening 11/18/2022   Retired Total Score -   Little Interest or Pleasure in Doing Things 0-->not at all   Feeling Down, Depressed or Hopeless 0-->not at all   PHQ-9: Brief Depression Severity Measure Score 0       Health Habits and Functional and Cognitive Screening:  Functional & Cognitive Status 11/18/2022   Do you have difficulty preparing food and eating? No   Do you have difficulty bathing yourself, getting dressed or grooming yourself? No   Do you have difficulty using the toilet? No   Do you have difficulty moving around from place to place? No   Do you have trouble with steps or getting out of a bed or a chair? No   Current Diet Unhealthy Diet   Dental Exam Up to date   Eye Exam Up to date   Exercise (times per week) 0 times per week   Current Exercises Include No Regular Exercise   Current Exercise Activities Include -   Do you need help using the phone?  No   Are you deaf or do you have serious difficulty hearing?  No   Do you need help with transportation? No   Do you need help shopping? No   Do you need help preparing meals?  No   Do you need help with housework?  No   Do you need help with laundry? No   Do you need help taking your medications? No   Do you need help managing money? No   Do you ever drive or ride in a car without wearing a seat belt? No   Have you felt unusual stress, anger or loneliness in the last month? No   Who do you live with? Child   If you need help, do you have trouble  finding someone available to you? No   Have you been bothered in the last four weeks by sexual problems? -   Do you have difficulty concentrating, remembering or making decisions? No       Age-appropriate Screening Schedule:  Refer to the list below for future screening recommendations based on patient's age, sex and/or medical conditions. Orders for these recommended tests are listed in the plan section. The patient has been provided with a written plan.    Health Maintenance   Topic Date Due   • ZOSTER VACCINE (1 of 2) Never done   • TDAP/TD VACCINES (1 - Tdap) 11/18/2023 (Originally 3/5/1964)   • DXA SCAN  10/11/2024   • INFLUENZA VACCINE  Completed              Assessment & Plan   CMS Preventative Services Quick Reference  Risk Factors Identified During Encounter  Immunizations Discussed/Encouraged (specific Immunizations; Tdap and Shingrix  Inactivity/Sedentary  Obesity/Overweight   The above risks/problems have been discussed with the patient.  Follow up actions/plans if indicated are seen below in the Assessment/Plan Section.  Pertinent information has been shared with the patient in the After Visit Summary.    Diagnoses and all orders for this visit:    1. Medicare annual wellness visit, subsequent (Primary)    2. Mammogram declined    Other orders  -     losartan (COZAAR) 50 MG tablet; Take 1 tablet by mouth Daily.  Dispense: 30 tablet; Refill: 2        Follow Up:   No follow-ups on file.     An After Visit Summary and PPPS were made available to the patient.

## 2022-11-18 NOTE — PROGRESS NOTES
Chief Complaint  Medicare Wellness-subsequent    Subjective     {CC  Problem List  Visit Diagnosis   Encounters  Notes  Medications  Labs  Result Review Imaging  Media :23}     Judith Sauceda presents to [facility] for     History of Present Illness  Patient presents for follow-up of HTN  This is an established problem  Interim treatment changes:  Current medications:        Judith Sauceda  has a past medical history of Bilateral nonexudative age-related macular degeneration, Constipation, Hypertension, Osteoporosis, and Prediabetes.      Review of Systems     Family History   Problem Relation Age of Onset   • Heart disease Mother    • Osteoporosis Mother    • Hip fracture Mother    • Ulcers Mother    • Kidney cancer Sister    • Parkinsonism Sister         Past Surgical History:   Procedure Laterality Date   • CATARACT EXTRACTION      march 3 and may 1 2020   • WRIST FRACTURE SURGERY Right 2/13/2021    Procedure: OPEN REDUCTION INTERNAL FIXATION DISTAL RADIUS;  Surgeon: Travis Francois MD;  Location: [facility] OR;  Service: Orthopedics;  Laterality: Right;        Social History     Socioeconomic History   • Marital status:    Tobacco Use   • Smoking status: Never   • Smokeless tobacco: Never   Vaping Use   • Vaping Use: Never used   Substance and Sexual Activity   • Alcohol use: Not Currently     Comment: rarely   • Drug use: Never   • Sexual activity: Defer        Objective       Current Outpatient Medications:   •  acetaminophen (TYLENOL) 500 MG tablet, Take 500 mg by mouth Every 6 (Six) Hours As Needed., Disp: , Rfl:   •  atorvastatin (LIPITOR) 20 MG tablet, Take 1 tablet by mouth Daily., Disp: 30 tablet, Rfl: 3  •  bisacodyl (DULCOLAX) 5 MG EC tablet, Take 5 mg by mouth Daily As Needed., Disp: , Rfl:   •  calcium carbonate (OS-BETHANY) 600 MG tablet, Take 600 mg by mouth 2 (Two) Times a Day With Meals., Disp: , Rfl:   •  melatonin 1 MG tablet, Take 2 mg by mouth Every  "Night., Disp: , Rfl:   •  Multiple Vitamins-Minerals (PRESERVISION AREDS 2 PO), Take 1 tablet by mouth Daily., Disp: , Rfl:   •  Psyllium (METAMUCIL FIBER PO), Take  by mouth., Disp: , Rfl:   •  Apple Cider Vinegar 300 MG tablet, Take 2 tablets by mouth 2 (Two) Times a Day., Disp: , Rfl:   •  ibuprofen (ADVIL,MOTRIN) 200 MG tablet, Take 200 mg by mouth Every 6 (Six) Hours As Needed for Mild Pain ., Disp: , Rfl:   •  lisinopril (PRINIVIL,ZESTRIL) 10 MG tablet, Take 1 tablet by mouth Daily. For blood pressure, Disp: 30 tablet, Rfl: 3    Vital Signs:      /80 (BP Location: Right arm, Patient Position: Sitting, Cuff Size: Adult)   Pulse 63   Temp 96.7 °F (35.9 °C) (Infrared)   Resp 16   Ht 170.2 cm (67\")   Wt 91.4 kg (201 lb 9.6 oz)   SpO2 96%   BMI 31.58 kg/m²     Vitals:    11/18/22 1055   BP: 133/80   BP Location: Right arm   Patient Position: Sitting   Cuff Size: Adult   Pulse: 63   Resp: 16   Temp: 96.7 °F (35.9 °C)   TempSrc: Infrared   SpO2: 96%   Weight: 91.4 kg (201 lb 9.6 oz)   Height: 170.2 cm (67\")      Physical Exam   Result Review :{ Labs  Result Review  Imaging  Med Tab  Media :23}   {The following data was reviewed by (Optional):06301}  {Ambulatory Labs (Optional):28884}  {Data reviewed (Optional):35092:::1}           PHQ-9 Total Score: 0               Assessment and Plan {CC Problem List  Visit Diagnosis  ROS  Review (Popup)  Health Maintenance  Quality  BestPractice  Medications  SmartSets  SnapShot Encounters  Media :23}   There are no diagnoses linked to this encounter.       {Time Spent (Optional):51658}  Follow Up {Instructions Charge Capture  Follow-up Communications :23}  No follow-ups on file.  Patient was given instructions and counseling regarding her condition or for health maintenance advice. Please see specific information pulled into the AVS if appropriate.    There are no Patient Instructions on file for this visit.       "

## 2022-11-20 PROBLEM — E55.9 VITAMIN D DEFICIENCY: Status: ACTIVE | Noted: 2022-11-20

## 2022-11-20 RX ORDER — MELATONIN
1000 DAILY
COMMUNITY

## 2022-11-21 NOTE — PROGRESS NOTES
Chief Complaint  Medicare Wellness-subsequent, Hypertension, Hyperlipidemia, Prediabetes, Vitamin D Deficiency, and osteopenia    Subjective          Judith Sauceda presents to [facility] for     History of Present Illness  Judith is a 77 year old female.    Patient presents for follow-up of HTN  This is an established problem.  Improved.   Interim treatment changes: started lisinopril 10 mg daily on 10/5/22  Current medications: lisinopril 10 mg daily.  She has developed a cough since starting the lisinopril    Patient presents for follow-up of hyperlipidemia  This is an established problem.  Interim treatment changes: started atorvastatin 20 mg daily on 10/13/22  Current medications: atorvastatin 20 mg daily  No myalgias since starting atorvastatin.    Patient presents for follow-up of prediabetes  This is an established problem.  Interim treatment changes: making lifestyle changes  Current medications: none  Hgba1c 6.2 % on 10/5/22    Patient presents for follow-up of osteopenia  This is an established problem  DEXA scan 10/11/22  Vitamin D level low on 10/5/22  Interim treatment changes: taking Vit D and calcium supplements  Current medications: Vit D and Calcium        Judith Sauceda  has a past medical history of Bilateral nonexudative age-related macular degeneration, Constipation, Hypertension, Osteoporosis, and Prediabetes.      Review of Systems   Constitutional: Positive for fatigue. Negative for fever.   HENT: Negative for congestion, ear pain, sore throat, trouble swallowing and voice change.    Eyes: Negative for discharge, itching and visual disturbance.   Respiratory: Positive for cough and shortness of breath (with exertion; up and down steps).    Cardiovascular: Negative for chest pain, palpitations and leg swelling.   Gastrointestinal: Positive for constipation. Negative for abdominal distention, abdominal pain, blood in stool, diarrhea, nausea and vomiting.        No  heartburn   Endocrine: Negative for cold intolerance, heat intolerance, polydipsia, polyphagia and polyuria.   Genitourinary: Negative for difficulty urinating, dysuria, flank pain, hematuria and vaginal bleeding.   Musculoskeletal: Positive for back pain. Negative for arthralgias and myalgias.   Skin: Negative for rash.   Neurological: Negative for dizziness, light-headedness and headaches.   Hematological: Negative for adenopathy. Does not bruise/bleed easily.   Psychiatric/Behavioral: Negative for dysphoric mood. The patient is not nervous/anxious.         Family History   Problem Relation Age of Onset   • Heart disease Mother    • Osteoporosis Mother    • Hip fracture Mother    • Ulcers Mother    • Kidney cancer Sister    • Parkinsonism Sister         Past Surgical History:   Procedure Laterality Date   • CATARACT EXTRACTION      march 3 and may 1 2020   • WRIST FRACTURE SURGERY Right 2/13/2021    Procedure: OPEN REDUCTION INTERNAL FIXATION DISTAL RADIUS;  Surgeon: Travis Francois MD;  Location: [facility];  Service: Orthopedics;  Laterality: Right;        Social History     Socioeconomic History   • Marital status:    Tobacco Use   • Smoking status: Never   • Smokeless tobacco: Never   Vaping Use   • Vaping Use: Never used   Substance and Sexual Activity   • Alcohol use: Not Currently     Comment: rarely   • Drug use: Never   • Sexual activity: Defer        Objective       Current Outpatient Medications:   •  acetaminophen (TYLENOL) 500 MG tablet, Take 500 mg by mouth Every 6 (Six) Hours As Needed., Disp: , Rfl:   •  atorvastatin (LIPITOR) 20 MG tablet, Take 1 tablet by mouth Daily., Disp: 30 tablet, Rfl: 3  •  bisacodyl (DULCOLAX) 5 MG EC tablet, Take 5 mg by mouth Daily As Needed., Disp: , Rfl:   •  calcium carbonate (OS-BETHANY) 600 MG tablet, Take 600 mg by mouth 2 (Two) Times a Day With Meals., Disp: , Rfl:   •  cholecalciferol (VITAMIN D3) 25 MCG (1000 UT) tablet, Take 1,000 Units by mouth Daily.,  "Disp: , Rfl:   •  melatonin 1 MG tablet, Take 2 mg by mouth Every Night., Disp: , Rfl:   •  Multiple Vitamins-Minerals (PRESERVISION AREDS 2 PO), Take 1 tablet by mouth Daily., Disp: , Rfl:   •  Psyllium (METAMUCIL FIBER PO), Take  by mouth., Disp: , Rfl:   •  Apple Cider Vinegar 300 MG tablet, Take 2 tablets by mouth 2 (Two) Times a Day., Disp: , Rfl:   •  ibuprofen (ADVIL,MOTRIN) 200 MG tablet, Take 200 mg by mouth Every 6 (Six) Hours As Needed for Mild Pain ., Disp: , Rfl:   •  losartan (COZAAR) 50 MG tablet, Take 1 tablet by mouth Daily., Disp: 30 tablet, Rfl: 2    Vital Signs:      /80 (BP Location: Right arm, Patient Position: Sitting, Cuff Size: Adult)   Pulse 63   Temp 96.7 °F (35.9 °C) (Infrared)   Resp 16   Ht 170.2 cm (67\")   Wt 91.4 kg (201 lb 9.6 oz)   SpO2 96%   BMI 31.58 kg/m²     Vitals:    11/18/22 1055   BP: 133/80   BP Location: Right arm   Patient Position: Sitting   Cuff Size: Adult   Pulse: 63   Resp: 16   Temp: 96.7 °F (35.9 °C)   TempSrc: Infrared   SpO2: 96%   Weight: 91.4 kg (201 lb 9.6 oz)   Height: 170.2 cm (67\")      Physical Exam  Vitals reviewed.   Constitutional:       General: She is not in acute distress.     Appearance: Normal appearance. She is obese.   HENT:      Head: Normocephalic and atraumatic.      Mouth/Throat:      Mouth: Mucous membranes are moist.      Pharynx: Oropharynx is clear.   Eyes:      General: No scleral icterus.     Conjunctiva/sclera: Conjunctivae normal.   Neck:      Thyroid: No thyromegaly.   Cardiovascular:      Rate and Rhythm: Normal rate and regular rhythm.      Heart sounds: Normal heart sounds.   Pulmonary:      Effort: Pulmonary effort is normal. No respiratory distress.      Breath sounds: Normal breath sounds. No wheezing.   Musculoskeletal:      Cervical back: Neck supple.      Right lower leg: No edema.      Left lower leg: No edema.   Lymphadenopathy:      Cervical: No cervical adenopathy.   Skin:     General: Skin is warm and dry. "   Neurological:      Mental Status: She is alert and oriented to person, place, and time.   Psychiatric:         Mood and Affect: Mood normal.        Result Review :     CMP    CMP 10/5/22   Glucose 104 (A)   BUN 16   Creatinine 0.77   Sodium 141   Potassium 5.0   Chloride 103   Calcium 10.5 (A)   Total Protein 7.1   Albumin 4.5   Globulin 2.6   Total Bilirubin 0.3   Alkaline Phosphatase 80   AST (SGOT) 17   ALT (SGPT) 18   (A) Abnormal value       Comments are available for some flowsheets but are not being displayed.           CBC    CBC 10/5/22   WBC 9.7   RBC 4.84   Hemoglobin 14.0   Hematocrit 42.7   MCV 88   MCH 28.9   MCHC 32.8   RDW 13.1   Platelets 327           CBC w/diff    CBC w/Diff 10/5/22   WBC 9.7   RBC 4.84   Hemoglobin 14.0   Hematocrit 42.7   MCV 88   MCH 28.9   MCHC 32.8   RDW 13.1   Platelets 327   Neutrophil Rel % 62   Lymphocyte Rel % 26   Monocyte Rel % 8   Eosinophil Rel % 3   Basophil Rel % 1           Lipid Panel    Lipid Panel 10/5/22   Total Cholesterol 208 (A)   Triglycerides 182 (A)   HDL Cholesterol 47   VLDL Cholesterol 32   LDL Cholesterol  129 (A)   (A) Abnormal value            TSH    TSH 10/5/22   TSH 2.380               Data reviewed: Radiologic studies DEXA scan on 10/11/22           PHQ-9 Total Score: 0               Assessment and Plan    Diagnoses and all orders for this visit:    1. Medicare annual wellness visit, subsequent (Primary)    2. Primary hypertension  Assessment & Plan:  Stop lisinopril due to cough.  Start losartan 50 mg daily.      Orders:  -     losartan (COZAAR) 50 MG tablet; Take 1 tablet by mouth Daily.  Dispense: 30 tablet; Refill: 2    3. Dyslipidemia  Assessment & Plan:  Continue atorvastatin 20 mg daily.  Recheck fasting lipid panel in 2 months.      4. Prediabetes  Assessment & Plan:  Continue lifestyle changes.  Recheck hgba1c in 3-6 months      5. Osteopenia, unspecified location  Assessment & Plan:  Continue daily Vitamin D and calcium supplements.   Weight bearing exercises.       6. Vitamin D deficiency  Assessment & Plan:  Continue Vitamin D supplement      7. Mammogram declined  Comments:  Discussed mammogram but she declined.           Follow Up   Return in about 2 months (around 1/18/2023) for Follow up HTN, dyslipidemia, prediabetes.  Patient was given instructions and counseling regarding her condition or for health maintenance advice. Please see specific information pulled into the AVS if appropriate.    There are no Patient Instructions on file for this visit.

## 2023-01-16 NOTE — PROGRESS NOTES
Chief Complaint  Hypertension, Hyperlipidemia, and Prediabetes    Subjective          Judith Sauceda presents to [facility] for     History of Present Illness  Judith is a 77 year old female.     Patient presents for follow-up of HTN  This is an established problem.     Interim treatment changes:  Stopped lisinopril due to cough.  Started losartan 50 mg daily on 11/18/22  Current medications: losartan 50 mg daily     Patient presents for follow-up of hyperlipidemia  This is an established problem.  Interim treatment changes: started atorvastatin 20 mg daily on 10/13/22  Current medications: atorvastatin 20 mg daily  No myalgias since starting atorvastatin.     Patient presents for follow-up of prediabetes  This is an established problem.   Interim treatment changes: making lifestyle changes  Current medications: none  Hgba1c 6.2 % on 10/5/22     Patient presents for follow-up of osteopenia  This is an established problem  DEXA scan 10/11/22  Vitamin D level low on 10/5/22  Interim treatment changes: taking Vit D and calcium supplements  Current medications: Vit D and Calcium        Judith Sauceda  has a past medical history of Bilateral nonexudative age-related macular degeneration, Constipation, Hyperlipidemia, Hypertension, Osteoporosis, and Prediabetes.      Review of Systems   Constitutional: Negative for fever.   HENT: Negative for ear pain and sore throat.    Eyes: Negative for visual disturbance.   Respiratory: Negative for cough and shortness of breath.    Cardiovascular: Negative for chest pain, palpitations and leg swelling.   Gastrointestinal: Negative for abdominal pain.   Musculoskeletal: Negative for myalgias.   Neurological: Negative for dizziness and headaches.        Family History   Problem Relation Age of Onset   • Heart disease Mother    • Osteoporosis Mother    • Hip fracture Mother    • Ulcers Mother    • Kidney cancer Sister    • Parkinsonism Sister         Past  "Surgical History:   Procedure Laterality Date   • CATARACT EXTRACTION      march 3 and may 1 2020   • WRIST FRACTURE SURGERY Right 2/13/2021    Procedure: OPEN REDUCTION INTERNAL FIXATION DISTAL RADIUS;  Surgeon: Travis Francois MD;  Location: [facility];  Service: Orthopedics;  Laterality: Right;        Social History     Socioeconomic History   • Marital status:    Tobacco Use   • Smoking status: Never   • Smokeless tobacco: Never   Vaping Use   • Vaping Use: Never used   Substance and Sexual Activity   • Alcohol use: Not Currently     Comment: rarely   • Drug use: Never   • Sexual activity: Defer        Objective       Current Outpatient Medications:   •  acetaminophen (TYLENOL) 500 MG tablet, Take 500 mg by mouth Every 6 (Six) Hours As Needed., Disp: , Rfl:   •  Apple Cider Vinegar 300 MG tablet, Take 2 tablets by mouth 2 (Two) Times a Day., Disp: , Rfl:   •  atorvastatin (LIPITOR) 20 MG tablet, Take 1 tablet by mouth Daily., Disp: 30 tablet, Rfl: 3  •  bisacodyl (DULCOLAX) 5 MG EC tablet, Take 5 mg by mouth Daily As Needed., Disp: , Rfl:   •  calcium carbonate (OS-BETHANY) 600 MG tablet, Take 600 mg by mouth 2 (Two) Times a Day With Meals., Disp: , Rfl:   •  cholecalciferol (VITAMIN D3) 25 MCG (1000 UT) tablet, Take 1,000 Units by mouth Daily., Disp: , Rfl:   •  ibuprofen (ADVIL,MOTRIN) 200 MG tablet, Take 200 mg by mouth Every 6 (Six) Hours As Needed for Mild Pain ., Disp: , Rfl:   •  losartan (COZAAR) 50 MG tablet, Take 1 tablet by mouth Daily., Disp: 30 tablet, Rfl: 2  •  melatonin 1 MG tablet, Take 2 mg by mouth Every Night., Disp: , Rfl:   •  Multiple Vitamins-Minerals (PRESERVISION AREDS 2 PO), Take 1 tablet by mouth Daily., Disp: , Rfl:   •  Psyllium (METAMUCIL FIBER PO), Take  by mouth., Disp: , Rfl:     Vital Signs:      /79 (BP Location: Left arm, Patient Position: Sitting, Cuff Size: Large Adult)   Pulse 77   Temp 97.7 °F (36.5 °C) (Infrared)   Resp 16   Ht 170.2 cm (67\")   Wt 90.7 " "kg (200 lb)   SpO2 93%   BMI 31.32 kg/m²     Vitals:    01/18/23 1307 01/18/23 1312   BP: 145/83 129/79   BP Location: Right arm Left arm   Patient Position: Sitting Sitting   Cuff Size: Small Adult Large Adult   Pulse: 77    Resp: 16    Temp: 97.7 °F (36.5 °C)    TempSrc: Infrared    SpO2: 93%    Weight: 90.7 kg (200 lb)    Height: 170.2 cm (67\")       Physical Exam  Vitals reviewed.   Constitutional:       General: She is not in acute distress.     Appearance: Normal appearance. She is obese.   HENT:      Head: Normocephalic and atraumatic.      Right Ear: Tympanic membrane, ear canal and external ear normal.      Left Ear: Tympanic membrane, ear canal and external ear normal.      Mouth/Throat:      Mouth: Mucous membranes are moist.      Pharynx: Oropharynx is clear.   Eyes:      General: No scleral icterus.     Conjunctiva/sclera: Conjunctivae normal.   Cardiovascular:      Rate and Rhythm: Normal rate and regular rhythm.      Heart sounds: Normal heart sounds.   Pulmonary:      Effort: Pulmonary effort is normal. No respiratory distress.      Breath sounds: Normal breath sounds. No wheezing.   Musculoskeletal:      Cervical back: Neck supple.      Right lower leg: No edema.      Left lower leg: No edema.   Lymphadenopathy:      Cervical: No cervical adenopathy.   Skin:     General: Skin is warm and dry.   Neurological:      Mental Status: She is alert and oriented to person, place, and time.   Psychiatric:         Mood and Affect: Mood normal.        Result Review :                     Assessment and Plan    Diagnoses and all orders for this visit:    1. Primary hypertension (Primary)  Assessment & Plan:  Continue losartan 50 mg daily.    Orders:  -     Comprehensive Metabolic Panel    2. Dyslipidemia  Assessment & Plan:  Continue atorvastatin 20 mg daily.  Recheck fasting lipid panel.    Orders:  -     Lipid Panel    3. Prediabetes  Assessment & Plan:  Continue lifestyle changes.  Recheck " hgba1c    Orders:  -     Hemoglobin A1c    4. Osteopenia, unspecified location  Assessment & Plan:  Continue daily Vitamin D and calcium supplements.  Weight bearing exercises.      5. Vitamin D deficiency  Assessment & Plan:  Continue Vitamin D supplement.    Orders:  -     Vitamin D,25-Hydroxy    6. Class 1 obesity due to excess calories with serious comorbidity and body mass index (BMI) of 31.0 to 31.9 in adult  Assessment & Plan:  Patient's (Body mass index is 31.32 kg/m².) indicates that they are obese (BMI >30) with health conditions that include hypertension and dyslipidemias . Weight is unchanged. BMI is above average; BMI management plan is completed. We discussed portion control and increasing exercise.              Follow Up   Return in about 4 months (around 5/18/2023) for Follow up hypertension.  Patient was given instructions and counseling regarding her condition or for health maintenance advice. Please see specific information pulled into the AVS if appropriate.    There are no Patient Instructions on file for this visit.

## 2023-01-18 ENCOUNTER — OFFICE VISIT (OUTPATIENT)
Dept: FAMILY MEDICINE CLINIC | Facility: CLINIC | Age: 78
End: 2023-01-18
Payer: MEDICARE

## 2023-01-18 VITALS
WEIGHT: 200 LBS | SYSTOLIC BLOOD PRESSURE: 129 MMHG | OXYGEN SATURATION: 93 % | BODY MASS INDEX: 31.39 KG/M2 | RESPIRATION RATE: 16 BRPM | TEMPERATURE: 97.7 F | DIASTOLIC BLOOD PRESSURE: 79 MMHG | HEART RATE: 77 BPM | HEIGHT: 67 IN

## 2023-01-18 DIAGNOSIS — E55.9 VITAMIN D DEFICIENCY: ICD-10-CM

## 2023-01-18 DIAGNOSIS — I10 PRIMARY HYPERTENSION: Primary | ICD-10-CM

## 2023-01-18 DIAGNOSIS — E78.5 DYSLIPIDEMIA: ICD-10-CM

## 2023-01-18 DIAGNOSIS — M85.80 OSTEOPENIA, UNSPECIFIED LOCATION: ICD-10-CM

## 2023-01-18 DIAGNOSIS — E66.09 CLASS 1 OBESITY DUE TO EXCESS CALORIES WITH SERIOUS COMORBIDITY AND BODY MASS INDEX (BMI) OF 31.0 TO 31.9 IN ADULT: ICD-10-CM

## 2023-01-18 DIAGNOSIS — R73.03 PREDIABETES: ICD-10-CM

## 2023-01-18 PROCEDURE — 99214 OFFICE O/P EST MOD 30 MIN: CPT | Performed by: NURSE PRACTITIONER

## 2023-01-18 RX ORDER — LISINOPRIL 10 MG/1
10 TABLET ORAL DAILY
COMMUNITY
Start: 2022-12-31 | End: 2023-01-21

## 2023-01-21 LAB
25(OH)D3+25(OH)D2 SERPL-MCNC: 68.5 NG/ML (ref 30–100)
ALBUMIN SERPL-MCNC: 4.4 G/DL (ref 3.7–4.7)
ALBUMIN/GLOB SERPL: 1.8 {RATIO} (ref 1.2–2.2)
ALP SERPL-CCNC: 81 IU/L (ref 44–121)
ALT SERPL-CCNC: 13 IU/L (ref 0–32)
AST SERPL-CCNC: 14 IU/L (ref 0–40)
BILIRUB SERPL-MCNC: 0.6 MG/DL (ref 0–1.2)
BUN SERPL-MCNC: 13 MG/DL (ref 8–27)
BUN/CREAT SERPL: 15 (ref 12–28)
CALCIUM SERPL-MCNC: 9.8 MG/DL (ref 8.7–10.3)
CHLORIDE SERPL-SCNC: 105 MMOL/L (ref 96–106)
CHOLEST SERPL-MCNC: 122 MG/DL (ref 100–199)
CO2 SERPL-SCNC: 22 MMOL/L (ref 20–29)
CREAT SERPL-MCNC: 0.86 MG/DL (ref 0.57–1)
EGFRCR SERPLBLD CKD-EPI 2021: 69 ML/MIN/1.73
GLOBULIN SER CALC-MCNC: 2.4 G/DL (ref 1.5–4.5)
GLUCOSE SERPL-MCNC: 128 MG/DL (ref 70–99)
HBA1C MFR BLD: 6.3 % (ref 4.8–5.6)
HDLC SERPL-MCNC: 40 MG/DL
LDLC SERPL CALC-MCNC: 65 MG/DL (ref 0–99)
POTASSIUM SERPL-SCNC: 4.8 MMOL/L (ref 3.5–5.2)
PROT SERPL-MCNC: 6.8 G/DL (ref 6–8.5)
SODIUM SERPL-SCNC: 141 MMOL/L (ref 134–144)
TRIGL SERPL-MCNC: 85 MG/DL (ref 0–149)
VLDLC SERPL CALC-MCNC: 17 MG/DL (ref 5–40)

## 2023-01-21 NOTE — ASSESSMENT & PLAN NOTE
Patient's (Body mass index is 31.32 kg/m².) indicates that they are obese (BMI >30) with health conditions that include hypertension and dyslipidemias . Weight is unchanged. BMI is above average; BMI management plan is completed. We discussed portion control and increasing exercise.

## 2023-01-22 NOTE — PROGRESS NOTES
Let her know:  1. CMP - Blood sugar was 128.  Electrolytes, kidney function and liver enzymes are normal.    2. Hgba1c 6.3 % - prediabetes.  This is higher than last time.... work on low carbohydrate diet, exercise and weight loss.  We can consider starting metformin.  Make a follow up appt in 3 months to recheck.    3. Lipid panel - normal.  Continue atorvastatin 20 mg daily.  Recheck lipid panel in 1 year.    4. Vitamin D normal.     5. Let her know I spoke to [facility] about her blood pressure medicine.  I tried to call her today (Saturday 1/21/23) and no answer.  Tell her that the pharmacy did fill her lisinopril at the end of December.  So she does have 2 different blood pressure medicines (lisinopril and losartan) in her bottle.  Tell her to go to the pharmacy and have them tell her which medicine is the losartan and that is the only one she is to take.  I told the pharmacy to stop the lisinopril.      6. Have her come by office for a blood pressure check in 2-3 weeks after we know she is only taking losartan daily.

## 2023-01-23 ENCOUNTER — TELEPHONE (OUTPATIENT)
Dept: FAMILY MEDICINE CLINIC | Facility: CLINIC | Age: 78
End: 2023-01-23
Payer: MEDICARE

## 2023-01-23 NOTE — TELEPHONE ENCOUNTER
Patient called confused about her blood pressure medication. Per Carries last note patient was to go to her pharmacy with the bottle that has two different pills in it. She was to ask them which of the two is Losartan and was to take the Losartan and discontinue the Lisinopril. I went though all this again with patient and she advised she would go to [facility] to fine out.

## 2023-02-08 ENCOUNTER — TELEPHONE (OUTPATIENT)
Dept: FAMILY MEDICINE CLINIC | Facility: CLINIC | Age: 78
End: 2023-02-08
Payer: MEDICARE

## 2023-02-08 DIAGNOSIS — I10 PRIMARY HYPERTENSION: Primary | ICD-10-CM

## 2023-02-08 RX ORDER — LOSARTAN POTASSIUM 50 MG/1
50 TABLET ORAL DAILY
Qty: 30 TABLET | Refills: 2 | Status: SHIPPED | OUTPATIENT
Start: 2023-02-08

## 2023-02-08 NOTE — TELEPHONE ENCOUNTER
Patient is requesting refill of blood pressure medication. Stated she did not know the name of the medicine. I asked if it was losartan. She was not sure. She would like refill sent to [facility].

## 2023-02-13 ENCOUNTER — CLINICAL SUPPORT (OUTPATIENT)
Dept: FAMILY MEDICINE CLINIC | Facility: CLINIC | Age: 78
End: 2023-02-13
Payer: MEDICARE

## 2023-02-13 VITALS — DIASTOLIC BLOOD PRESSURE: 76 MMHG | OXYGEN SATURATION: 97 % | HEART RATE: 68 BPM | SYSTOLIC BLOOD PRESSURE: 138 MMHG

## 2023-02-13 DIAGNOSIS — I10 PRIMARY HYPERTENSION: ICD-10-CM

## 2023-04-03 RX ORDER — ATORVASTATIN CALCIUM 20 MG/1
20 TABLET, FILM COATED ORAL DAILY
Qty: 90 TABLET | Refills: 3 | Status: SHIPPED | OUTPATIENT
Start: 2023-04-03

## 2023-04-22 NOTE — PROGRESS NOTES
Chief Complaint  Hypertension, Prediabetes, and Hyperlipidemia    Subjective          Judith is a 78 y.o. female  who presents to [facility]     Patient Care Team:  April Soares APRN as PCP - General (Family Medicine)     History of Present Illness  Patient presents for follow-up of HTN  This is an established problem.     Interim treatment changes: none  Current medications: losartan 50 mg daily  She has lost weight.     Patient presents for follow-up of hyperlipidemia  This is an established problem.  Interim treatment changes: none  Current medications: atorvastatin 20 mg daily  No myalgias since starting atorvastatin.  Last lipid panel 1/20/23     Patient presents for follow-up of prediabetes  This is an established problem.   Improved  Interim treatment changes: making lifestyle changes: she has lost weight since last visit  Current medications: none  Hgba1c 6.3 % on 1/20/23     Patient presents for follow-up of osteopenia  This is an established problem  DEXA scan 10/11/22  Interim treatment changes: taking Vit D and calcium supplements  Current medications: Vit D and Calcium      Judith Sauceda  has a past medical history of Bilateral nonexudative age-related macular degeneration, Constipation, Hyperlipidemia, Hypertension, Osteoporosis, Prediabetes, and Vitamin D deficiency.      Review of Systems   Constitutional: Positive for fatigue. Negative for fever.   HENT: Negative for ear pain and sore throat.    Eyes: Negative for visual disturbance.   Respiratory: Negative for cough and shortness of breath.    Cardiovascular: Negative for chest pain, palpitations and leg swelling.   Gastrointestinal: Negative for abdominal pain.   Neurological: Negative for dizziness and headaches.        Family History   Problem Relation Age of Onset   • Heart disease Mother    • Osteoporosis Mother    • Hip fracture Mother    • Ulcers Mother    • Kidney cancer Sister    • Parkinsonism Sister          Past Surgical History:   Procedure Laterality Date   • CATARACT EXTRACTION      march 3 and may 1 2020   • WRIST FRACTURE SURGERY Right 2/13/2021    Procedure: OPEN REDUCTION INTERNAL FIXATION DISTAL RADIUS;  Surgeon: Travis Francois MD;  Location: [facility] MAIN OR;  Service: Orthopedics;  Laterality: Right;        Social History     Socioeconomic History   • Marital status:    Tobacco Use   • Smoking status: Never     Passive exposure: Never   • Smokeless tobacco: Never   Vaping Use   • Vaping Use: Never used   Substance and Sexual Activity   • Alcohol use: Not Currently     Comment: rarely   • Drug use: Never   • Sexual activity: Defer        Immunization History   Administered Date(s) Administered   • FLUAD TRI 65YR+ 10/12/2018   • Flu Vaccine Intradermal Quad 18-64YR 10/02/2012, 09/14/2013, 09/24/2014   • Fluzone High Dose =>65 Years ([employer] ONLY) 09/21/2015, 10/12/2016, 10/11/2017, 10/12/2018, 10/09/2019   • Fluzone High-Dose 65+yrs 10/05/2020, 10/05/2022   • Influenza Seasonal Injectable 10/02/2012, 09/14/2013, 09/24/2014   • Pneumococcal Conjugate 20-Valent (PCV20) 10/05/2022       Objective       Current Outpatient Medications:   •  losartan (COZAAR) 50 MG tablet, Take 1 tablet by mouth Daily., Disp: 90 tablet, Rfl: 2  •  acetaminophen (TYLENOL) 500 MG tablet, Take 500 mg by mouth Every 6 (Six) Hours As Needed., Disp: , Rfl:   •  Apple Cider Vinegar 300 MG tablet, Take 2 tablets by mouth 2 (Two) Times a Day., Disp: , Rfl:   •  atorvastatin (LIPITOR) 20 MG tablet, Take 1 tablet by mouth Daily., Disp: 90 tablet, Rfl: 3  •  bisacodyl (DULCOLAX) 5 MG EC tablet, Take 5 mg by mouth Daily As Needed., Disp: , Rfl:   •  calcium carbonate (OS-BETHANY) 600 MG tablet, Take 600 mg by mouth 2 (Two) Times a Day With Meals., Disp: , Rfl:   •  cholecalciferol (VITAMIN D3) 25 MCG (1000 UT) tablet, Take 1,000 Units by mouth Daily., Disp: , Rfl:   •  ibuprofen (ADVIL,MOTRIN) 200 MG tablet, Take 200 mg by mouth Every 6  "(Six) Hours As Needed for Mild Pain ., Disp: , Rfl:   •  melatonin 1 MG tablet, Take 2 mg by mouth Every Night., Disp: , Rfl:   •  Multiple Vitamins-Minerals (PRESERVISION AREDS 2 PO), Take 1 tablet by mouth Daily., Disp: , Rfl:   •  Psyllium (METAMUCIL FIBER PO), Take  by mouth., Disp: , Rfl:     Vital Signs:      /82 (BP Location: Right arm, Patient Position: Sitting, Cuff Size: Adult)   Pulse 64   Temp 97.4 °F (36.3 °C) (Temporal)   Resp 18   Ht 170.2 cm (67.01\")   Wt 82.4 kg (181 lb 9.6 oz)   SpO2 98%   BMI 28.44 kg/m²     Vitals:    04/26/23 0825   BP: 126/82   BP Location: Right arm   Patient Position: Sitting   Cuff Size: Adult   Pulse: 64   Resp: 18   Temp: 97.4 °F (36.3 °C)   TempSrc: Temporal   SpO2: 98%   Weight: 82.4 kg (181 lb 9.6 oz)   Height: 170.2 cm (67.01\")   PainSc: 0-No pain      Physical Exam  Vitals reviewed.   Constitutional:       General: She is not in acute distress.     Appearance: Normal appearance.   HENT:      Head: Normocephalic and atraumatic.      Right Ear: Tympanic membrane, ear canal and external ear normal.      Left Ear: Tympanic membrane, ear canal and external ear normal.      Mouth/Throat:      Mouth: Mucous membranes are moist.      Pharynx: Oropharynx is clear.   Eyes:      General: No scleral icterus.     Conjunctiva/sclera: Conjunctivae normal.   Cardiovascular:      Rate and Rhythm: Normal rate and regular rhythm.      Heart sounds: Normal heart sounds.   Pulmonary:      Effort: Pulmonary effort is normal. No respiratory distress.      Breath sounds: Normal breath sounds. No wheezing.   Musculoskeletal:      Cervical back: Neck supple.      Right lower leg: No edema.      Left lower leg: No edema.   Lymphadenopathy:      Cervical: No cervical adenopathy.   Skin:     General: Skin is warm and dry.   Neurological:      Mental Status: She is alert and oriented to person, place, and time.   Psychiatric:         Mood and Affect: Mood normal.        Result Review " :                Office Visit on 04/26/2023   Component Date Value Ref Range Status   • Hemoglobin A1C 04/26/2023 5.8  % Final   • Lot Number 04/26/2023 NA   Final   • Expiration Date 04/26/2023 NA   Final   Office Visit on 01/18/2023   Component Date Value Ref Range Status   • Glucose 01/20/2023 128 (H)  70 - 99 mg/dL Final   • BUN 01/20/2023 13  8 - 27 mg/dL Final   • Creatinine 01/20/2023 0.86  0.57 - 1.00 mg/dL Final   • EGFR Result 01/20/2023 69  >59 mL/min/1.73 Final   • BUN/Creatinine Ratio 01/20/2023 15  12 - 28 Final   • Sodium 01/20/2023 141  134 - 144 mmol/L Final   • Potassium 01/20/2023 4.8  3.5 - 5.2 mmol/L Final   • Chloride 01/20/2023 105  96 - 106 mmol/L Final   • Total CO2 01/20/2023 22  20 - 29 mmol/L Final   • Calcium 01/20/2023 9.8  8.7 - 10.3 mg/dL Final   • Total Protein 01/20/2023 6.8  6.0 - 8.5 g/dL Final   • Albumin 01/20/2023 4.4  3.7 - 4.7 g/dL Final   • Globulin 01/20/2023 2.4  1.5 - 4.5 g/dL Final   • A/G Ratio 01/20/2023 1.8  1.2 - 2.2 Final   • Total Bilirubin 01/20/2023 0.6  0.0 - 1.2 mg/dL Final   • Alkaline Phosphatase 01/20/2023 81  44 - 121 IU/L Final   • AST (SGOT) 01/20/2023 14  0 - 40 IU/L Final   • ALT (SGPT) 01/20/2023 13  0 - 32 IU/L Final   • Total Cholesterol 01/20/2023 122  100 - 199 mg/dL Final   • Triglycerides 01/20/2023 85  0 - 149 mg/dL Final   • HDL Cholesterol 01/20/2023 40  >39 mg/dL Final   • VLDL Cholesterol Andi 01/20/2023 17  5 - 40 mg/dL Final   • LDL Chol Calc (NIH) 01/20/2023 65  0 - 99 mg/dL Final   • Hemoglobin A1C 01/20/2023 6.3 (H)  4.8 - 5.6 % Final    Comment:          Prediabetes: 5.7 - 6.4           Diabetes: >6.4           Glycemic control for adults with diabetes: <7.0     • 25 Hydroxy, Vitamin D 01/20/2023 68.5  30.0 - 100.0 ng/mL Final    Comment: Vitamin D deficiency has been defined by the [place] of  Medicine and an Endocrine Society practice guideline as a  level of serum 25-OH vitamin D less than 20 ng/mL (1,2).  The Endocrine Society  went on to further define vitamin D  insufficiency as a level between 21 and 29 ng/mL (2).  1. IOM ([place] [employer]). 2010. Dietary reference     intakes for calcium and D. [place]: The     National Academies Press.  2. Kimberlee DUNLAP, Oneyda LONGO, Christina GARCIA, et al.     Evaluation, treatment, and prevention of vitamin D     deficiency: an Endocrine Society clinical practice     guideline. JCEM. 2011 Jul; 96(7):1911-30.                Assessment and Plan    Diagnoses and all orders for this visit:    1. Primary hypertension (Primary)  Assessment & Plan:  Continue losartan 50 mg daily.    Orders:  -     losartan (COZAAR) 50 MG tablet; Take 1 tablet by mouth Daily.  Dispense: 90 tablet; Refill: 2    2. Prediabetes  Assessment & Plan:  Hgba1c decreased from 6.3 % to 5.8 % today. Continue lifestyle changes.    Orders:  -     POC Glycosylated Hemoglobin (Hb A1C)    3. Dyslipidemia  Assessment & Plan:  Continue atorvastatin 20 mg daily.  Recheck lipid panel in January 2024      4. Osteopenia, unspecified location  Assessment & Plan:  Continue daily Vitamin D and calcium supplements.  Weight bearing exercises.      5. Vitamin D deficiency  Assessment & Plan:  Continue Vitamin D supplement.      6. Overweight (BMI 25.0-29.9)  Assessment & Plan:  Patient's (Body mass index is 28.44 kg/m².) indicates that they are overweight with health conditions that include hypertension and dyslipidemias . Weight is improving with lifestyle modifications. She has lost 19 lbs.  BMI is above average; BMI management plan is completed. We discussed continued portion control and increasing exercise.             Follow Up   Return in about 7 months (around 11/20/2023) for Medicare Wellness.  Patient was given instructions and counseling regarding her condition or for health maintenance advice. Please see specific information pulled into the AVS if appropriate.    There are no Patient Instructions on file for this visit.

## 2023-04-26 ENCOUNTER — OFFICE VISIT (OUTPATIENT)
Dept: FAMILY MEDICINE CLINIC | Facility: CLINIC | Age: 78
End: 2023-04-26
Payer: MEDICARE

## 2023-04-26 VITALS
HEART RATE: 64 BPM | RESPIRATION RATE: 18 BRPM | DIASTOLIC BLOOD PRESSURE: 82 MMHG | TEMPERATURE: 97.4 F | HEIGHT: 67 IN | OXYGEN SATURATION: 98 % | SYSTOLIC BLOOD PRESSURE: 126 MMHG | WEIGHT: 181.6 LBS | BODY MASS INDEX: 28.5 KG/M2

## 2023-04-26 DIAGNOSIS — E66.3 OVERWEIGHT (BMI 25.0-29.9): ICD-10-CM

## 2023-04-26 DIAGNOSIS — E55.9 VITAMIN D DEFICIENCY: ICD-10-CM

## 2023-04-26 DIAGNOSIS — R73.03 PREDIABETES: ICD-10-CM

## 2023-04-26 DIAGNOSIS — I10 PRIMARY HYPERTENSION: Primary | ICD-10-CM

## 2023-04-26 DIAGNOSIS — E78.5 DYSLIPIDEMIA: ICD-10-CM

## 2023-04-26 DIAGNOSIS — M85.80 OSTEOPENIA, UNSPECIFIED LOCATION: ICD-10-CM

## 2023-04-26 LAB
EXPIRATION DATE: NORMAL
HBA1C MFR BLD: 5.8 %
Lab: NORMAL

## 2023-04-26 RX ORDER — LOSARTAN POTASSIUM 50 MG/1
50 TABLET ORAL DAILY
Qty: 90 TABLET | Refills: 2 | Status: SHIPPED | OUTPATIENT
Start: 2023-04-26

## 2023-04-26 NOTE — ASSESSMENT & PLAN NOTE
Patient's (Body mass index is 28.44 kg/m².) indicates that they are overweight with health conditions that include hypertension and dyslipidemias . Weight is improving with lifestyle modifications. She has lost 19 lbs.  BMI is above average; BMI management plan is completed. We discussed continued portion control and increasing exercise.

## 2023-04-27 NOTE — ASSESSMENT & PLAN NOTE
Patient's (Body mass index is 28.44 kg/m².) indicates that they are overweight with health conditions that include hypertension and dyslipidemias . Weight is improving with lifestyle modifications. BMI is is above average; BMI management plan is completed. We discussed portion control and increasing exercise.

## 2023-11-22 ENCOUNTER — OFFICE VISIT (OUTPATIENT)
Dept: FAMILY MEDICINE CLINIC | Facility: CLINIC | Age: 78
End: 2023-11-22
Payer: MEDICARE

## 2023-11-22 VITALS
OXYGEN SATURATION: 95 % | HEIGHT: 67 IN | DIASTOLIC BLOOD PRESSURE: 65 MMHG | WEIGHT: 185.6 LBS | RESPIRATION RATE: 18 BRPM | BODY MASS INDEX: 29.13 KG/M2 | HEART RATE: 70 BPM | TEMPERATURE: 98.1 F | SYSTOLIC BLOOD PRESSURE: 108 MMHG

## 2023-11-22 DIAGNOSIS — Z23 FLU VACCINE NEED: ICD-10-CM

## 2023-11-22 DIAGNOSIS — Z53.20 MAMMOGRAM DECLINED: ICD-10-CM

## 2023-11-22 DIAGNOSIS — I10 PRIMARY HYPERTENSION: ICD-10-CM

## 2023-11-22 DIAGNOSIS — R73.03 PREDIABETES: ICD-10-CM

## 2023-11-22 DIAGNOSIS — M85.80 OSTEOPENIA, UNSPECIFIED LOCATION: ICD-10-CM

## 2023-11-22 DIAGNOSIS — E66.3 OVERWEIGHT (BMI 25.0-29.9): ICD-10-CM

## 2023-11-22 DIAGNOSIS — E78.5 DYSLIPIDEMIA: ICD-10-CM

## 2023-11-22 DIAGNOSIS — Z00.00 MEDICARE ANNUAL WELLNESS VISIT, SUBSEQUENT: Primary | ICD-10-CM

## 2023-11-22 DIAGNOSIS — H35.3130 BILATERAL NONEXUDATIVE AGE-RELATED MACULAR DEGENERATION, UNSPECIFIED STAGE: ICD-10-CM

## 2023-11-22 NOTE — PATIENT INSTRUCTIONS
Go to [facility] (across the [address] from office- [address]) for bloodwork.     You can get the tetanus vaccine (Tdap) and the shingles vaccine (Shingrix) at the pharmacy.

## 2023-11-22 NOTE — PROGRESS NOTES
The ABCs of the Annual Wellness Visit  Subsequent Medicare Wellness Visit    Subjective    Judith Sauceda is a 78 y.o. female who presents for a Subsequent Medicare Wellness Visit.    The following portions of the patient's history were reviewed and   updated as appropriate: allergies, current medications, past family history, past medical history, past social history, past surgical history, and problem list.    Compared to one year ago, the patient feels her physical   health is the same.    Compared to one year ago, the patient feels her mental   health is the same.    Recent Hospitalizations:  She was not admitted to the hospital during the last year.       Current Medical Providers:  Patient Care Team:  April Soares APRN as PCP - General (Family Medicine)  Tony Auguste OD (Optometry)    Outpatient Medications Prior to Visit   Medication Sig Dispense Refill    acetaminophen (TYLENOL) 500 MG tablet Take 1 tablet by mouth Every 6 (Six) Hours As Needed.      bisacodyl (DULCOLAX) 5 MG EC tablet Take 1 tablet by mouth Daily As Needed.      calcium carbonate (OS-BETHANY) 600 MG tablet Take 1 tablet by mouth 2 (Two) Times a Day With Meals.      cholecalciferol (VITAMIN D3) 25 MCG (1000 UT) tablet Take 1 tablet by mouth Daily.      ibuprofen (ADVIL,MOTRIN) 200 MG tablet Take 1 tablet by mouth Every 6 (Six) Hours As Needed for Mild Pain.      melatonin 1 MG tablet Take 2 tablets by mouth Every Night.      Psyllium (METAMUCIL FIBER PO) Take  by mouth.      atorvastatin (LIPITOR) 20 MG tablet Take 1 tablet by mouth Daily. 90 tablet 3    losartan (COZAAR) 50 MG tablet Take 1 tablet by mouth Daily. 90 tablet 2    Multiple Vitamins-Minerals (PRESERVISION AREDS 2 PO) Take 1 tablet by mouth Daily.      Apple Cider Vinegar 300 MG tablet Take 2 tablets by mouth 2 (Two) Times a Day. (Patient not taking: Reported on 11/22/2023)       No facility-administered medications prior to visit.       No opioid medication identified on  "active medication list. I have reviewed chart for other potential  high risk medication/s and harmful drug interactions in the elderly.        Aspirin is not on active medication list.  Aspirin use is not indicated based on review of current medical condition/s. Risk of harm outweighs potential benefits.  .    Patient Active Problem List   Diagnosis    Acute pain of left knee    Shortness of breath    Hypertension    Medicare annual wellness visit, subsequent    Closed fracture of right radius and ulna    Osteopenia    Bilateral nonexudative age-related macular degeneration    Dyslipidemia    Chronic constipation    Overweight (BMI 25.0-29.9)    Prediabetes    Vitamin D deficiency     Advance Care Planning   Advance Care Planning     Advance Directive is on file.  ACP discussion was held with the patient during this visit. Patient has an advance directive in EMR which is still valid.      Objective    Vitals:    11/22/23 0851   BP: 108/65   Pulse: 70   Resp: 18   Temp: 98.1 °F (36.7 °C)   TempSrc: Infrared   SpO2: 95%   Weight: 84.2 kg (185 lb 9.6 oz)   Height: 170.2 cm (67.01\")   PainSc: 0-No pain     Estimated body mass index is 29.06 kg/m² as calculated from the following:    Height as of this encounter: 170.2 cm (67.01\").    Weight as of this encounter: 84.2 kg (185 lb 9.6 oz).           Does the patient have evidence of cognitive impairment? No    Mini-Cog  Word Recall __3__ (0-3 points)    Clock Draw  _2___ (0 or 2 points)    Total Score __5__ (0-5 points)            HEALTH RISK ASSESSMENT    Smoking Status:  Social History     Tobacco Use   Smoking Status Never    Passive exposure: Never   Smokeless Tobacco Never     Alcohol Consumption:  Social History     Substance and Sexual Activity   Alcohol Use Not Currently    Comment: rarely     Fall Risk Screen:    STEADI Fall Risk Assessment was completed, and patient is at LOW risk for falls.Assessment completed on:11/22/2023    Depression Screening:      " 2023     8:56 AM   PHQ-2/PHQ-9 Depression Screening   Little Interest or Pleasure in Doing Things 0-->not at all   Feeling Down, Depressed or Hopeless 0-->not at all   PHQ-9: Brief Depression Severity Measure Score 0       Health Habits and Functional and Cognitive Screenin/22/2023     8:56 AM   Functional & Cognitive Status   Do you have difficulty preparing food and eating? No   Do you have difficulty bathing yourself, getting dressed or grooming yourself? No   Do you have difficulty using the toilet? No   Do you have difficulty moving around from place to place? No   Do you have trouble with steps or getting out of a bed or a chair? No   Current Diet Other   Dental Exam Up to date   Eye Exam Up to date   Exercise (times per week) 0 times per week   Current Exercises Include No Regular Exercise   Do you need help using the phone?  No   Are you deaf or do you have serious difficulty hearing?  Yes   Do you need help to go to places out of walking distance? No   Do you need help shopping? No   Do you need help preparing meals?  No   Do you need help with housework?  No   Do you need help with laundry? No   Do you need help taking your medications? No   Do you need help managing money? No   Do you ever drive or ride in a car without wearing a seat belt? No   Have you felt unusual stress, anger or loneliness in the last month? Yes   Who do you live with? Child   If you need help, do you have trouble finding someone available to you? No   Have you been bothered in the last four weeks by sexual problems? No   Do you have difficulty concentrating, remembering or making decisions? No       Age-appropriate Screening Schedule:  Refer to the list below for future screening recommendations based on patient's age, sex and/or medical conditions. Orders for these recommended tests are listed in the plan section. The patient has been provided with a written plan.    Health Maintenance   Topic Date Due    COVID-19  Vaccine (1) Never done    TDAP/TD VACCINES (1 - Tdap) Never done    ZOSTER VACCINE (1 of 2) Never done    LIPID PANEL  01/20/2024    DXA SCAN  10/11/2024    ANNUAL WELLNESS VISIT  11/22/2024    BMI FOLLOWUP  11/22/2024    HEPATITIS C SCREENING  Completed    INFLUENZA VACCINE  Completed    Pneumococcal Vaccine 65+  Completed                  CMS Preventative Services Quick Reference  Risk Factors Identified During Encounter  Immunizations Discussed/Encouraged: Tdap and Shingrix  Dental Screening Recommended  Vision Screening Recommended  The above risks/problems have been discussed with the patient.  Pertinent information has been shared with the patient in the After Visit Summary.  An After Visit Summary and PPPS were made available to the patient.    Follow Up:   Next Medicare Wellness visit to be scheduled in 1 year.       Additional E&M Note during same encounter follows:  Patient has multiple medical problems which are significant and separately identifiable that require additional work above and beyond the Medicare Wellness Visit.      Chief Complaint  Medicare Wellness-subsequent, Hypertension, Hyperlipidemia, and Prediabetes    Subjective        Judith Sauceda is also being seen today for the following:    Patient presents for follow-up of HTN  This is an established problem.     Interim treatment changes: none  Current medications: losartan 50 mg daily  Checks blood pressure at home ? No  Stopped lisinopril due to cough     Patient presents for follow-up of hyperlipidemia  This is an established problem.  Interim treatment changes: none  Current medications: atorvastatin 20 mg daily  Last lipid panel 1/20/23     Patient presents for follow-up of prediabetes  This is an established problem.   Interim treatment changes: making lifestyle changes  Current medications: none  Hgba1c 6.3 % on 1/20/23     Patient presents for follow-up of osteopenia  This is an established problem  DEXA scan 10/11/22  Interim  "treatment changes: taking Vit D and calcium supplements  Current medications: Vit D and Calcium        Review of Systems   Constitutional:  Negative for fatigue and fever.   HENT:  Negative for ear pain, sore throat and trouble swallowing.    Eyes:  Negative for visual disturbance.   Respiratory:  Positive for shortness of breath (with exertion). Negative for cough.    Cardiovascular:  Negative for chest pain, palpitations and leg swelling.   Gastrointestinal:  Positive for constipation (chronic). Negative for abdominal pain, blood in stool, nausea and vomiting.   Endocrine: Negative for cold intolerance and heat intolerance.   Genitourinary:  Negative for dysuria and hematuria.   Skin:  Negative for rash.   Neurological:  Negative for dizziness.       Objective   Vital Signs:  /65   Pulse 70   Temp 98.1 °F (36.7 °C) (Infrared)   Resp 18   Ht 170.2 cm (67.01\")   Wt 84.2 kg (185 lb 9.6 oz)   SpO2 95%   BMI 29.06 kg/m²     Physical Exam  Vitals reviewed.   Constitutional:       General: She is not in acute distress.     Appearance: Normal appearance.   HENT:      Head: Normocephalic and atraumatic.      Right Ear: Tympanic membrane, ear canal and external ear normal.      Left Ear: Tympanic membrane, ear canal and external ear normal.      Mouth/Throat:      Mouth: Mucous membranes are moist.      Pharynx: Oropharynx is clear.   Eyes:      General: No scleral icterus.     Conjunctiva/sclera: Conjunctivae normal.   Neck:      Thyroid: No thyromegaly.   Cardiovascular:      Rate and Rhythm: Normal rate and regular rhythm.      Heart sounds: Normal heart sounds.   Pulmonary:      Effort: Pulmonary effort is normal. No respiratory distress.      Breath sounds: Normal breath sounds. No wheezing.   Musculoskeletal:      Cervical back: Neck supple.      Right lower leg: No edema.      Left lower leg: No edema.   Lymphadenopathy:      Cervical: No cervical adenopathy.   Skin:     General: Skin is warm and dry. "   Neurological:      Mental Status: She is alert and oriented to person, place, and time.   Psychiatric:         Mood and Affect: Mood normal.                       Assessment and Plan   Diagnoses and all orders for this visit:    1. Medicare annual wellness visit, subsequent (Primary)  Assessment & Plan:  Updated annual wellness visit checklist.  Immunizations discussed. Recommended annual eye and dental exams. Recommended use of seatbelts, sunscreen and smoke detectors in the home.        2. Primary hypertension  Assessment & Plan:  Chronic stable problem.  Continue losartan 50 mg daily.    Orders:  -     Comprehensive Metabolic Panel  -     losartan (COZAAR) 50 MG tablet; Take 1 tablet by mouth Daily.  Dispense: 90 tablet; Refill: 1    3. Dyslipidemia  Assessment & Plan:  Chronic stable problem.  Continue atorvastatin 20 mg daily.  Recheck lipid panel in January 2024    Orders:  -     Lipid Panel  -     atorvastatin (LIPITOR) 20 MG tablet; Take 1 tablet by mouth Daily.  Dispense: 90 tablet; Refill: 1    4. Prediabetes  Assessment & Plan:  Chronic stable problem.  Continue lifestyle changes.  Check hgba1c    Orders:  -     Hemoglobin A1c    5. Osteopenia, unspecified location  Assessment & Plan:  DEXA 10/11/2022   Continue daily Vitamin D and calcium supplements.  Weight bearing exercises.      6. Bilateral nonexudative age-related macular degeneration, unspecified stage  Assessment & Plan:  Continue follow-up with your eye doctor, Dr. Auguste.  She states she has an appointment in Feb 2024.      7. Flu vaccine need  -     Fluzone High-Dose 65+yrs    8. Mammogram declined  Comments:  Recommended mammogram but she declined.    9. Overweight (BMI 25.0-29.9)  Assessment & Plan:  Patient's (Body mass index is 29.06 kg/m².) indicates that they are overweight with health conditions that include hypertension, dyslipidemias, and prediabetes  . Weight is improving with lifestyle modifications. BMI is above average; BMI  management plan is completed. We discussed portion control and increasing exercise.         Go to [facility] (across the [address] from office- [address]) for bloodwork.            Follow Up   Return in about 1 year (around 11/22/2024) for Medicare Wellness.  Patient was given instructions and counseling regarding her condition or for health maintenance advice. Please see specific information pulled into the AVS if appropriate.

## 2023-11-24 RX ORDER — LOSARTAN POTASSIUM 50 MG/1
50 TABLET ORAL DAILY
Qty: 90 TABLET | Refills: 1 | Status: SHIPPED | OUTPATIENT
Start: 2023-11-24

## 2023-11-24 RX ORDER — ATORVASTATIN CALCIUM 20 MG/1
20 TABLET, FILM COATED ORAL DAILY
Qty: 90 TABLET | Refills: 1 | Status: SHIPPED | OUTPATIENT
Start: 2023-11-24

## 2023-11-24 NOTE — ASSESSMENT & PLAN NOTE
Continue follow-up with your eye doctor, Dr. Auguste.  She states she has an appointment in Feb 2024.

## 2023-11-24 NOTE — ASSESSMENT & PLAN NOTE
Patient's (Body mass index is 29.06 kg/m².) indicates that they are overweight with health conditions that include hypertension, dyslipidemias, and prediabetes  . Weight is improving with lifestyle modifications. BMI is above average; BMI management plan is completed. We discussed portion control and increasing exercise.

## 2023-11-24 NOTE — ASSESSMENT & PLAN NOTE
Updated annual wellness visit checklist.  Immunizations discussed. Recommended annual eye and dental exams. Recommended use of seatbelts, sunscreen and smoke detectors in the home.

## 2023-12-06 LAB
ALBUMIN SERPL-MCNC: 4.2 G/DL (ref 3.8–4.8)
ALBUMIN/GLOB SERPL: 1.7 {RATIO} (ref 1.2–2.2)
ALP SERPL-CCNC: 82 IU/L (ref 44–121)
ALT SERPL-CCNC: 11 IU/L (ref 0–32)
AST SERPL-CCNC: 11 IU/L (ref 0–40)
BILIRUB SERPL-MCNC: 0.5 MG/DL (ref 0–1.2)
BUN SERPL-MCNC: 18 MG/DL (ref 8–27)
BUN/CREAT SERPL: 24 (ref 12–28)
CALCIUM SERPL-MCNC: 9.5 MG/DL (ref 8.7–10.3)
CHLORIDE SERPL-SCNC: 107 MMOL/L (ref 96–106)
CHOLEST SERPL-MCNC: 138 MG/DL (ref 100–199)
CO2 SERPL-SCNC: 22 MMOL/L (ref 20–29)
CREAT SERPL-MCNC: 0.76 MG/DL (ref 0.57–1)
EGFRCR SERPLBLD CKD-EPI 2021: 80 ML/MIN/1.73
GLOBULIN SER CALC-MCNC: 2.5 G/DL (ref 1.5–4.5)
GLUCOSE SERPL-MCNC: 100 MG/DL (ref 70–99)
HBA1C MFR BLD: 6.3 % (ref 4.8–5.6)
HDLC SERPL-MCNC: 49 MG/DL
LDLC SERPL CALC-MCNC: 75 MG/DL (ref 0–99)
POTASSIUM SERPL-SCNC: 5 MMOL/L (ref 3.5–5.2)
PROT SERPL-MCNC: 6.7 G/DL (ref 6–8.5)
SODIUM SERPL-SCNC: 144 MMOL/L (ref 134–144)
TRIGL SERPL-MCNC: 68 MG/DL (ref 0–149)
VLDLC SERPL CALC-MCNC: 14 MG/DL (ref 5–40)

## 2024-05-24 DIAGNOSIS — E78.5 DYSLIPIDEMIA: ICD-10-CM

## 2024-05-24 DIAGNOSIS — I10 PRIMARY HYPERTENSION: ICD-10-CM

## 2024-05-24 RX ORDER — ATORVASTATIN CALCIUM 20 MG/1
20 TABLET, FILM COATED ORAL DAILY
Qty: 90 TABLET | Refills: 1 | Status: SHIPPED | OUTPATIENT
Start: 2024-05-24

## 2024-05-24 RX ORDER — LOSARTAN POTASSIUM 50 MG/1
50 TABLET ORAL DAILY
Qty: 90 TABLET | Refills: 1 | Status: SHIPPED | OUTPATIENT
Start: 2024-05-24

## 2024-11-21 DIAGNOSIS — I10 PRIMARY HYPERTENSION: ICD-10-CM

## 2024-11-21 DIAGNOSIS — E78.5 DYSLIPIDEMIA: ICD-10-CM

## 2024-11-21 RX ORDER — LOSARTAN POTASSIUM 50 MG/1
50 TABLET ORAL DAILY
Qty: 90 TABLET | Refills: 0 | Status: SHIPPED | OUTPATIENT
Start: 2024-11-21

## 2024-11-21 RX ORDER — ATORVASTATIN CALCIUM 20 MG/1
20 TABLET, FILM COATED ORAL DAILY
Qty: 90 TABLET | Refills: 0 | Status: SHIPPED | OUTPATIENT
Start: 2024-11-21

## 2024-11-26 NOTE — ASSESSMENT & PLAN NOTE
Chronic stable problem.  Continue losartan 50 mg daily    Orders:    CBC & Differential    Comprehensive Metabolic Panel

## 2024-11-26 NOTE — ASSESSMENT & PLAN NOTE
DEXA 10/11/2022   Continue daily Vitamin D and calcium supplements.    Weight bearing exercises.  Ordered DEXA    Orders:    DEXA Bone Density Axial; Future

## 2024-11-26 NOTE — PROGRESS NOTES
Subjective   The ABCs of the Annual Wellness Visit  Medicare Wellness Visit      Judtih Sauceda is a 79 y.o. patient who presents for a Medicare Wellness Visit.    The following portions of the patient's history were reviewed and   updated as appropriate: allergies, current medications, past family history, past medical history, past social history, past surgical history, and problem list.    Compared to one year ago, the patient's physical   health is the same.  Compared to one year ago, the patient's mental   health is the same.    Recent Hospitalizations:  She was not admitted to the hospital during the last year.     Current Medical Providers:  Patient Care Team:  April Soares APRN as PCP - General (Family Medicine)  Tony Auguste OD (Optometry)    Outpatient Medications Prior to Visit   Medication Sig Dispense Refill    acetaminophen (TYLENOL) 500 MG tablet Take 1 tablet by mouth Every 6 (Six) Hours As Needed.      atorvastatin (LIPITOR) 20 MG tablet TAKE 1 TABLET BY MOUTH DAILY 90 tablet 0    bisacodyl (DULCOLAX) 5 MG EC tablet Take 1 tablet by mouth Daily As Needed.      calcium carbonate (OS-BETHANY) 600 MG tablet Take 1 tablet by mouth 2 (Two) Times a Day With Meals.      cholecalciferol (VITAMIN D3) 25 MCG (1000 UT) tablet Take 1 tablet by mouth Daily.      ibuprofen (ADVIL,MOTRIN) 200 MG tablet Take 1 tablet by mouth Every 6 (Six) Hours As Needed for Mild Pain.      losartan (COZAAR) 50 MG tablet TAKE 1 TABLET BY MOUTH DAILY 90 tablet 0    melatonin 1 MG tablet Take 2 tablets by mouth Every Night.      Multiple Vitamins-Minerals (PRESERVISION AREDS 2 PO) Take 1 tablet by mouth Daily.      Psyllium (METAMUCIL FIBER PO) Take  by mouth.       No facility-administered medications prior to visit.     No opioid medication identified on active medication list. I have reviewed chart for other potential  high risk medication/s and harmful drug interactions in the elderly.      Aspirin is not on active  "medication list.  Aspirin use is not indicated based on review of current medical condition/s. Risk of harm outweighs potential benefits.  .    Patient Active Problem List   Diagnosis    Acute pain of left knee    Shortness of breath    Hypertension    Medicare annual wellness visit, subsequent    Closed fracture of right radius and ulna    Osteopenia    Bilateral nonexudative age-related macular degeneration    Dyslipidemia    Chronic constipation    Overweight (BMI 25.0-29.9)    Prediabetes    Vitamin D deficiency     Advance Care Planning Advance Directive is on file.  ACP discussion was held with the patient during this visit. Patient has an advance directive in EMR which is still valid.       Objective   Vitals:    11/27/24 0900   BP: 128/70   BP Location: Right arm   Patient Position: Sitting   Cuff Size: Large Adult   Pulse: 63   Resp: 18   Temp: 97.7 °F (36.5 °C)   TempSrc: Temporal   SpO2: 96%   Weight: 86.4 kg (190 lb 6.4 oz)   Height: 170.2 cm (67.01\")   PainSc: 0-No pain       Estimated body mass index is 29.81 kg/m² as calculated from the following:    Height as of this encounter: 170.2 cm (67.01\").    Weight as of this encounter: 86.4 kg (190 lb 6.4 oz).    BMI is >= 25 and <30. (Overweight) The following options were offered after discussion;: exercise counseling/recommendations       Does the patient have evidence of cognitive impairment? No    Lab Results   Component Value Date    CHLPL 144 11/27/2024    TRIG 104 11/27/2024    HDL 51 11/27/2024    LDL 74 11/27/2024    VLDL 19 11/27/2024    HGBA1C 6.3 (H) 11/27/2024                                                                                                Health  Risk Assessment    Smoking Status:  Social History     Tobacco Use   Smoking Status Never    Passive exposure: Never   Smokeless Tobacco Never     Alcohol Consumption:  Social History     Substance and Sexual Activity   Alcohol Use Not Currently    Comment: rarely       Fall Risk " Screen  STEADI Fall Risk Assessment was completed, and patient is at LOW risk for falls.Assessment completed on:2024    Depression Screening   Little interest or pleasure in doing things? Not at all   Feeling down, depressed, or hopeless? Not at all   PHQ-2 Total Score 0      Health Habits and Functional and Cognitive Screenin/27/2024     8:58 AM   Functional & Cognitive Status   Do you have difficulty preparing food and eating? No   Do you have difficulty bathing yourself, getting dressed or grooming yourself? No   Do you have difficulty using the toilet? No   Do you have difficulty moving around from place to place? No   Do you have trouble with steps or getting out of a bed or a chair? No   Current Diet Well Balanced Diet   Dental Exam Up to date   Eye Exam Up to date   Exercise (times per week) 0 times per week   Current Exercises Include Walking;House Cleaning   Do you need help using the phone?  No   Are you deaf or do you have serious difficulty hearing?  No   Do you need help to go to places out of walking distance? No   Do you need help shopping? No   Do you need help preparing meals?  No   Do you need help with housework?  No   Do you need help with laundry? No   Do you need help taking your medications? No   Do you need help managing money? No   Do you ever drive or ride in a car without wearing a seat belt? No   Have you felt unusual stress, anger or loneliness in the last month? No   Who do you live with? Child   If you need help, do you have trouble finding someone available to you? No   Have you been bothered in the last four weeks by sexual problems? No   Do you have difficulty concentrating, remembering or making decisions? No           Age-appropriate Screening Schedule:  Refer to the list below for future screening recommendations based on patient's age, sex and/or medical conditions. Orders for these recommended tests are listed in the plan section. The patient has been provided  with a written plan.    Health Maintenance List  Health Maintenance   Topic Date Due    TDAP/TD VACCINES (1 - Tdap) Never done    ZOSTER VACCINE (1 of 2) Never done    RSV Vaccine - Adults (1 - 1-dose 75+ series) Never done    COVID-19 Vaccine (1 - 2024-25 season) Never done    DXA SCAN  10/11/2024    ANNUAL WELLNESS VISIT  11/22/2024    BMI FOLLOWUP  11/22/2024    LIPID PANEL  11/27/2025    INFLUENZA VACCINE  Completed    Pneumococcal Vaccine 65+  Completed                                                                                                                                                CMS Preventative Services Quick Reference  Risk Factors Identified During Encounter  Immunizations Discussed/Encouraged: Tdap, Shingrix, and RSV (Respiratory Syncytial Virus)  Dental Screening Recommended  Vision Screening Recommended    The above risks/problems have been discussed with the patient.  Pertinent information has been shared with the patient in the After Visit Summary.  An After Visit Summary and PPPS were made available to the patient.    Follow Up:   Next Medicare Wellness visit to be scheduled in 1 year.         Additional E&M Note during same encounter follows:  Patient has additional, significant, and separately identifiable condition(s)/problem(s) that require work above and beyond the Medicare Wellness Visit     Chief Complaint  Medicare Wellness-subsequent, Hypertension, Hyperlipidemia, and Prediabetes    Subjective     Judith is also being seen today for additional medical problem/s.    Patient presents for follow-up of HTN  This is an established problem.     Interim treatment changes: none  Current medications: losartan 50 mg daily  Checks blood pressure at home ? No   Stopped lisinopril due to cough    ++++++++++++++++++++++++    Patient presents for follow-up of hyperlipidemia  This is an established problem.  Interim treatment changes: none  Current medications: atorvastatin 20 mg daily  Last  "lipid panel 12/5/2023    ++++++++++++++++++++++++    Patient presents for follow-up of prediabetes  This is an established problem.   Interim treatment changes: making lifestyle changes  Current medications: none  Hgba1c 6.3 % on 1/20/2023  Hgba1c 5.8 % on 4/26/2023  Hgba1c 6.3 % on 12/5/2023    ++++++++++++++++++++++++    Patient presents for follow-up of osteopenia  This is an established problem  DEXA scan 10/11/22  Interim treatment changes: taking Vit D and calcium supplements  Current medications: Vit D and Calcium        Review of Systems   Constitutional:  Positive for fatigue. Negative for fever.   HENT:  Positive for congestion. Negative for ear pain, sore throat and trouble swallowing.    Eyes:  Negative for visual disturbance.   Respiratory:  Positive for shortness of breath (with steps). Negative for cough.    Cardiovascular:  Negative for chest pain, palpitations and leg swelling.   Gastrointestinal:  Positive for constipation. Negative for abdominal pain, blood in stool, diarrhea, nausea and vomiting.        No heartburn   Endocrine: Negative for cold intolerance, heat intolerance, polydipsia, polyphagia and polyuria.   Genitourinary:  Negative for dysuria and hematuria.   Musculoskeletal:  Positive for back pain (chronic).   Skin:  Negative for rash.   Allergic/Immunologic: Positive for environmental allergies.   Neurological:  Negative for dizziness, tremors, syncope and numbness.   Psychiatric/Behavioral:  Positive for sleep disturbance (wakes up at night).           Objective   Vital Signs:  /70 (BP Location: Right arm, Patient Position: Sitting, Cuff Size: Large Adult)   Pulse 63   Temp 97.7 °F (36.5 °C) (Temporal)   Resp 18   Ht 170.2 cm (67.01\")   Wt 86.4 kg (190 lb 6.4 oz)   SpO2 96%   BMI 29.81 kg/m²   Physical Exam  Vitals reviewed.   Constitutional:       General: She is not in acute distress.     Appearance: Normal appearance.   HENT:      Head: Normocephalic and atraumatic. "      Right Ear: Tympanic membrane, ear canal and external ear normal.      Left Ear: Tympanic membrane, ear canal and external ear normal.      Mouth/Throat:      Mouth: Mucous membranes are moist.      Pharynx: Oropharynx is clear.   Eyes:      General: No scleral icterus.     Conjunctiva/sclera: Conjunctivae normal.   Neck:      Thyroid: No thyromegaly.   Cardiovascular:      Rate and Rhythm: Normal rate and regular rhythm.      Heart sounds: Normal heart sounds.   Pulmonary:      Effort: Pulmonary effort is normal. No respiratory distress.      Breath sounds: Normal breath sounds. No wheezing.   Abdominal:      General: There is no distension.      Palpations: Abdomen is soft. There is no mass.      Tenderness: There is no abdominal tenderness. There is no right CVA tenderness, left CVA tenderness or guarding.   Musculoskeletal:      Cervical back: Neck supple.      Right lower leg: No edema.      Left lower leg: No edema.   Lymphadenopathy:      Cervical: No cervical adenopathy.   Skin:     General: Skin is warm and dry.   Neurological:      Mental Status: She is alert and oriented to person, place, and time.   Psychiatric:         Mood and Affect: Mood normal.         Behavior: Behavior normal.             Assessment and Plan        Medicare annual wellness visit, subsequent  Updated annual wellness visit checklist.  Immunizations discussed. Recommended annual eye and dental exams. Recommended use of seatbelts, sunscreen and smoke detectors in the home.         Primary hypertension  Chronic stable problem.  Continue losartan 50 mg daily    Orders:    CBC & Differential    Comprehensive Metabolic Panel    Dyslipidemia  Chronic stable problem.  Continue atorvastatin 20 mg daily.    Recheck lipid panel     Orders:    Lipid Panel    Prediabetes  Chronic stable problem.  Continue lifestyle changes.    Check hgba1c    Orders:    Hemoglobin A1c    Osteopenia, unspecified location  DEXA 10/11/2022   Continue daily  Vitamin D and calcium supplements.    Weight bearing exercises.  Ordered DEXA    Orders:    DEXA Bone Density Axial; Future    Bilateral nonexudative age-related macular degeneration, unspecified stage  Continue follow-up with your eye doctor, Dr. Auguste.    She states she has an appointment in Feb 2025          Chronic constipation  Chronic problem  Recommended over-the-counter MiraLAX daily.  High-fiber diet.        Postmenopause  Ordered DEXA scan  Orders:    DEXA Bone Density Axial; Future    Flu vaccine need  Flu vaccine given  Orders:    Fluzone High-Dose 65+yrs (1983-9777)    Fatigue, unspecified type  Check labs  Orders:    TSH Rfx On Abnormal To Free T4    Mammogram declined  Discussed mammogram and she declines to schedule.       Overweight (BMI 25.0-29.9)  Patient's (Body mass index is 29.81 kg/m².) indicates that they are overweight with health conditions that include hypertension, dyslipidemias, and prediabetes  . Weight is  stable . BMI is above average; BMI management plan is completed. We discussed portion control and increasing exercise.                 Follow Up   Return in about 1 year (around 11/27/2025) for Medicare Wellness.  Patient was given instructions and counseling regarding her condition or for health maintenance advice. Please see specific information pulled into the AVS if appropriate.

## 2024-11-26 NOTE — ASSESSMENT & PLAN NOTE
Chronic stable problem.  Continue lifestyle changes.    Check hgba1c    Orders:    Hemoglobin A1c

## 2024-11-26 NOTE — ASSESSMENT & PLAN NOTE
Chronic stable problem.  Continue atorvastatin 20 mg daily.    Recheck lipid panel     Orders:    Lipid Panel

## 2024-11-26 NOTE — ASSESSMENT & PLAN NOTE
Continue follow-up with your eye doctor, Dr. Auguste.    She states she has an appointment in Feb 2025

## 2024-11-27 ENCOUNTER — OFFICE VISIT (OUTPATIENT)
Dept: FAMILY MEDICINE CLINIC | Facility: CLINIC | Age: 79
End: 2024-11-27
Payer: MEDICARE

## 2024-11-27 VITALS
TEMPERATURE: 97.7 F | SYSTOLIC BLOOD PRESSURE: 128 MMHG | DIASTOLIC BLOOD PRESSURE: 70 MMHG | HEART RATE: 63 BPM | BODY MASS INDEX: 29.88 KG/M2 | HEIGHT: 67 IN | RESPIRATION RATE: 18 BRPM | WEIGHT: 190.4 LBS | OXYGEN SATURATION: 96 %

## 2024-11-27 DIAGNOSIS — K59.09 CHRONIC CONSTIPATION: ICD-10-CM

## 2024-11-27 DIAGNOSIS — E78.5 DYSLIPIDEMIA: ICD-10-CM

## 2024-11-27 DIAGNOSIS — Z78.0 POSTMENOPAUSE: ICD-10-CM

## 2024-11-27 DIAGNOSIS — H35.3130 BILATERAL NONEXUDATIVE AGE-RELATED MACULAR DEGENERATION, UNSPECIFIED STAGE: ICD-10-CM

## 2024-11-27 DIAGNOSIS — M85.80 OSTEOPENIA, UNSPECIFIED LOCATION: ICD-10-CM

## 2024-11-27 DIAGNOSIS — Z23 FLU VACCINE NEED: ICD-10-CM

## 2024-11-27 DIAGNOSIS — E66.3 OVERWEIGHT (BMI 25.0-29.9): ICD-10-CM

## 2024-11-27 DIAGNOSIS — Z53.20 MAMMOGRAM DECLINED: ICD-10-CM

## 2024-11-27 DIAGNOSIS — R73.03 PREDIABETES: ICD-10-CM

## 2024-11-27 DIAGNOSIS — I10 PRIMARY HYPERTENSION: ICD-10-CM

## 2024-11-27 DIAGNOSIS — Z00.00 MEDICARE ANNUAL WELLNESS VISIT, SUBSEQUENT: Primary | ICD-10-CM

## 2024-11-27 DIAGNOSIS — R53.83 FATIGUE, UNSPECIFIED TYPE: ICD-10-CM

## 2024-11-27 PROCEDURE — 3074F SYST BP LT 130 MM HG: CPT | Performed by: NURSE PRACTITIONER

## 2024-11-27 PROCEDURE — G0439 PPPS, SUBSEQ VISIT: HCPCS | Performed by: NURSE PRACTITIONER

## 2024-11-27 PROCEDURE — 99214 OFFICE O/P EST MOD 30 MIN: CPT | Performed by: NURSE PRACTITIONER

## 2024-11-27 PROCEDURE — 1160F RVW MEDS BY RX/DR IN RCRD: CPT | Performed by: NURSE PRACTITIONER

## 2024-11-27 PROCEDURE — 1126F AMNT PAIN NOTED NONE PRSNT: CPT | Performed by: NURSE PRACTITIONER

## 2024-11-27 PROCEDURE — 3078F DIAST BP <80 MM HG: CPT | Performed by: NURSE PRACTITIONER

## 2024-11-27 PROCEDURE — 1159F MED LIST DOCD IN RCRD: CPT | Performed by: NURSE PRACTITIONER

## 2024-11-27 PROCEDURE — 90662 IIV NO PRSV INCREASED AG IM: CPT | Performed by: NURSE PRACTITIONER

## 2024-11-27 PROCEDURE — G0008 ADMIN INFLUENZA VIRUS VAC: HCPCS | Performed by: NURSE PRACTITIONER

## 2024-11-27 NOTE — PATIENT INSTRUCTIONS
Go to [facility] (across the [address] from office- [address]) for bloodwork.     The [facility] Scheduling Department will contact you to schedule ordered testing (DEXA scan).  If you would like to schedule or verify your appointment, you can call [facility] Scheduling at [phone].

## 2024-11-28 LAB
ALBUMIN SERPL-MCNC: 4.2 G/DL (ref 3.8–4.8)
ALP SERPL-CCNC: 73 IU/L (ref 44–121)
ALT SERPL-CCNC: 12 IU/L (ref 0–32)
AST SERPL-CCNC: 11 IU/L (ref 0–40)
BASOPHILS # BLD AUTO: 0 X10E3/UL (ref 0–0.2)
BASOPHILS NFR BLD AUTO: 1 %
BILIRUB SERPL-MCNC: 0.3 MG/DL (ref 0–1.2)
BUN SERPL-MCNC: 15 MG/DL (ref 8–27)
BUN/CREAT SERPL: 19 (ref 12–28)
CALCIUM SERPL-MCNC: 9.5 MG/DL (ref 8.7–10.3)
CHLORIDE SERPL-SCNC: 106 MMOL/L (ref 96–106)
CHOLEST SERPL-MCNC: 144 MG/DL (ref 100–199)
CO2 SERPL-SCNC: 23 MMOL/L (ref 20–29)
CREAT SERPL-MCNC: 0.81 MG/DL (ref 0.57–1)
EGFRCR SERPLBLD CKD-EPI 2021: 73 ML/MIN/1.73
EOSINOPHIL # BLD AUTO: 0.2 X10E3/UL (ref 0–0.4)
EOSINOPHIL NFR BLD AUTO: 2 %
ERYTHROCYTE [DISTWIDTH] IN BLOOD BY AUTOMATED COUNT: 12.9 % (ref 11.7–15.4)
GLOBULIN SER CALC-MCNC: 2.5 G/DL (ref 1.5–4.5)
GLUCOSE SERPL-MCNC: 109 MG/DL (ref 70–99)
HBA1C MFR BLD: 6.3 % (ref 4.8–5.6)
HCT VFR BLD AUTO: 41.7 % (ref 34–46.6)
HDLC SERPL-MCNC: 51 MG/DL
HGB BLD-MCNC: 13.6 G/DL (ref 11.1–15.9)
IMM GRANULOCYTES # BLD AUTO: 0 X10E3/UL (ref 0–0.1)
IMM GRANULOCYTES NFR BLD AUTO: 0 %
LDLC SERPL CALC-MCNC: 74 MG/DL (ref 0–99)
LYMPHOCYTES # BLD AUTO: 2.1 X10E3/UL (ref 0.7–3.1)
LYMPHOCYTES NFR BLD AUTO: 25 %
MCH RBC QN AUTO: 29.8 PG (ref 26.6–33)
MCHC RBC AUTO-ENTMCNC: 32.6 G/DL (ref 31.5–35.7)
MCV RBC AUTO: 91 FL (ref 79–97)
MONOCYTES # BLD AUTO: 0.7 X10E3/UL (ref 0.1–0.9)
MONOCYTES NFR BLD AUTO: 9 %
NEUTROPHILS # BLD AUTO: 5.2 X10E3/UL (ref 1.4–7)
NEUTROPHILS NFR BLD AUTO: 63 %
PLATELET # BLD AUTO: 297 X10E3/UL (ref 150–450)
POTASSIUM SERPL-SCNC: 5.1 MMOL/L (ref 3.5–5.2)
PROT SERPL-MCNC: 6.7 G/DL (ref 6–8.5)
RBC # BLD AUTO: 4.56 X10E6/UL (ref 3.77–5.28)
SODIUM SERPL-SCNC: 142 MMOL/L (ref 134–144)
TRIGL SERPL-MCNC: 104 MG/DL (ref 0–149)
TSH SERPL DL<=0.005 MIU/L-ACNC: 3.85 UIU/ML (ref 0.45–4.5)
VLDLC SERPL CALC-MCNC: 19 MG/DL (ref 5–40)
WBC # BLD AUTO: 8.3 X10E3/UL (ref 3.4–10.8)

## 2024-11-29 NOTE — ASSESSMENT & PLAN NOTE
Patient's (Body mass index is 29.81 kg/m².) indicates that they are overweight with health conditions that include hypertension, dyslipidemias, and prediabetes . Weight is stable. BMI is above average; BMI management plan is completed. We discussed portion control and increasing exercise.

## 2024-11-29 NOTE — PROGRESS NOTES
Let her know:    1. CMP - Fasting blood sugar is high - 109  Kidney function, electrolytes and liver enzymes are normal.    2. Hgba1c 6.3 % = prediabetes.  Continue lifestyle changes.     Make appointment in 6 months and we can do an inhouse hgba1c.    3. CBC - complete blood cell count (CBC) is normal.    4. Lipid panel - lipid panel (cholesterol) is normal. Continue atorvastatin 20 mg daily.  Recheck in 1 year.    5. TSH - thyroid function is normal.

## 2024-12-20 ENCOUNTER — HOSPITAL ENCOUNTER (OUTPATIENT)
Dept: BONE DENSITY | Facility: HOSPITAL | Age: 79
Discharge: HOME | End: 2024-12-20
Payer: MEDICARE

## 2024-12-20 DIAGNOSIS — M85.80 OSTEOPENIA, UNSPECIFIED LOCATION: ICD-10-CM

## 2024-12-20 DIAGNOSIS — Z78.0 POSTMENOPAUSE: ICD-10-CM

## 2024-12-20 PROCEDURE — 77080 DXA BONE DENSITY AXIAL: CPT

## 2024-12-20 NOTE — PROGRESS NOTES
Let her know her DEXA scan (bone density) shows osteopenia.     Osteopenia is thinning of the bones not severe enough to call osteoporosis.     Treatment at this stage includes:  -  being physically active to support bone health.  Exercise every day. Walking, jogging and other activities that make you bear your own weight are particularly helpful, as is weight training     - adequate intake of both calcium and vitamin D (recommended calcium intake is 1200 mg per day and recommended vitamin D3 intake about 1000 units day)     - avoidance of alcohol/tobacco.       Repeat in 2 years.

## 2025-02-17 DIAGNOSIS — I10 PRIMARY HYPERTENSION: ICD-10-CM

## 2025-02-17 DIAGNOSIS — E78.5 DYSLIPIDEMIA: ICD-10-CM

## 2025-02-17 RX ORDER — LOSARTAN POTASSIUM 50 MG/1
50 TABLET ORAL DAILY
Qty: 90 TABLET | Refills: 2 | Status: SHIPPED | OUTPATIENT
Start: 2025-02-17

## 2025-02-17 RX ORDER — ATORVASTATIN CALCIUM 20 MG/1
20 TABLET, FILM COATED ORAL DAILY
Qty: 90 TABLET | Refills: 2 | Status: SHIPPED | OUTPATIENT
Start: 2025-02-17

## 2025-06-11 ENCOUNTER — OFFICE VISIT (OUTPATIENT)
Dept: FAMILY MEDICINE CLINIC | Facility: CLINIC | Age: 80
End: 2025-06-11
Payer: MEDICARE

## 2025-06-11 VITALS
HEART RATE: 66 BPM | OXYGEN SATURATION: 98 % | DIASTOLIC BLOOD PRESSURE: 64 MMHG | RESPIRATION RATE: 18 BRPM | WEIGHT: 190.4 LBS | SYSTOLIC BLOOD PRESSURE: 120 MMHG | HEIGHT: 67 IN | TEMPERATURE: 97.8 F | BODY MASS INDEX: 29.88 KG/M2

## 2025-06-11 DIAGNOSIS — H35.3130 BILATERAL NONEXUDATIVE AGE-RELATED MACULAR DEGENERATION, UNSPECIFIED STAGE: ICD-10-CM

## 2025-06-11 DIAGNOSIS — R73.03 PREDIABETES: ICD-10-CM

## 2025-06-11 DIAGNOSIS — I10 PRIMARY HYPERTENSION: Primary | ICD-10-CM

## 2025-06-11 DIAGNOSIS — E66.3 OVERWEIGHT (BMI 25.0-29.9): ICD-10-CM

## 2025-06-11 DIAGNOSIS — E78.5 DYSLIPIDEMIA: ICD-10-CM

## 2025-06-11 DIAGNOSIS — M85.80 OSTEOPENIA, UNSPECIFIED LOCATION: ICD-10-CM

## 2025-06-11 LAB
EXPIRATION DATE: NORMAL
HBA1C MFR BLD: 5.7 % (ref 4.5–5.7)
Lab: NORMAL

## 2025-06-11 NOTE — PROGRESS NOTES
Chief Complaint  Hypertension, Hyperlipidemia, and Prediabetes    Subjective          Judith is a 80 y.o. female  who presents to [facility]     Patient Care Team:  April Soares APRN as PCP - General (Family Medicine)  Tony Auguste OD (Optometry)     History of Present Illness  Patient presents for follow-up of HTN  This is an established problem.     Interim treatment changes: none  Current medications: losartan 50 mg daily  Checks blood pressure at home ? No   Stopped lisinopril due to cough     ++++++++++++++++++++++++     Patient presents for follow-up of hyperlipidemia  This is an established problem.  Interim treatment changes: none  Current medications: atorvastatin 20 mg daily  Last lipid panel 11/27/2024     ++++++++++++++++++++++++     Patient presents for follow-up of prediabetes  This is an established problem.   Interim treatment changes: making lifestyle changes  Current medications: none  Hgba1c 6.3 % on 11/27/2024  Hgba1c 5.7 % on 6/11/2025     ++++++++++++++++++++++++     Patient presents for follow-up of osteopenia  This is an established problem  Interim treatment changes: taking Vit D and calcium supplements  Current medications: Vit D and Calcium  DEXA scan 10/11/22  DEXA 12/20/2024 = osteopenia      Judith Sauceda  has a past medical history of Bilateral nonexudative age-related macular degeneration, Constipation, Hyperlipidemia, Hypertension, Osteoporosis, Prediabetes, and Vitamin D deficiency.      Review of Systems   Constitutional:  Negative for fever.   Eyes:  Negative for visual disturbance.   Respiratory:  Negative for cough and shortness of breath.    Cardiovascular:  Negative for chest pain, palpitations and leg swelling.   Gastrointestinal:  Negative for abdominal pain.   Neurological:  Negative for dizziness and headaches.        Family History   Problem Relation Age of Onset    Heart disease Mother     Osteoporosis Mother     Hip fracture  Mother     Ulcers Mother     Kidney cancer Sister     Parkinsonism Sister     Breast cancer Sister         Past Surgical History:   Procedure Laterality Date    CATARACT EXTRACTION      march 3 and may 1 2020    WRIST FRACTURE SURGERY Right 2/13/2021    Procedure: OPEN REDUCTION INTERNAL FIXATION DISTAL RADIUS;  Surgeon: Travis Francois MD;  Location: [facility] MA[facility] OR;  Service: Orthopedics;  Laterality: Right;        Social History     Socioeconomic History    Marital status:    Tobacco Use    Smoking status: Never     Passive exposure: Never    Smokeless tobacco: Never   Vaping Use    Vaping status: Never Used   Substance and Sexual Activity    Alcohol use: Not Currently    Drug use: Never    Sexual activity: Defer        Immunization History   Administered Date(s) Administered    FLUAD TRI 65YR+ 10/12/2018    Flu Vaccine Intradermal Quad 18-64YR 10/02/2012, 09/14/2013, 09/24/2014    Fluzone High-Dose 65+YRS 09/21/2015, 10/12/2016, 10/11/2017, 10/12/2018, 10/09/2019, 11/27/2024    Fluzone High-Dose 65+yrs 10/05/2020, 10/05/2022, 11/22/2023    Influenza Seasonal Injectable 10/02/2012, 09/14/2013, 09/24/2014    Pneumococcal Conjugate 20-Valent (PCV20) 10/05/2022       Objective       Current Outpatient Medications:     atorvastatin (LIPITOR) 20 MG tablet, TAKE 1 TABLET BY MOUTH DAILY, Disp: 90 tablet, Rfl: 2    bisacodyl (DULCOLAX) 5 MG EC tablet, Take 1 tablet by mouth Daily As Needed., Disp: , Rfl:     calcium carbonate (OS-BETHANY) 600 MG tablet, Take 1 tablet by mouth 2 (Two) Times a Day With Meals., Disp: , Rfl:     cholecalciferol (VITAMIN D3) 25 MCG (1000 UT) tablet, Take 1 tablet by mouth Daily., Disp: , Rfl:     ibuprofen (ADVIL,MOTRIN) 200 MG tablet, Take 1 tablet by mouth Every 6 (Six) Hours As Needed for Mild Pain., Disp: , Rfl:     losartan (COZAAR) 50 MG tablet, TAKE 1 TABLET BY MOUTH DAILY, Disp: 90 tablet, Rfl: 2    melatonin 1 MG tablet, Take 2 tablets by mouth Every Night., Disp: , Rfl:      "Multiple Vitamins-Minerals (PRESERVISION AREDS 2 PO), Take 1 tablet by mouth Daily., Disp: , Rfl:     Psyllium (METAMUCIL FIBER PO), Take  by mouth., Disp: , Rfl:     Vital Signs:      /64   Pulse 66   Temp 97.8 °F (36.6 °C) (Temporal)   Resp 18   Ht 170.2 cm (67.01\")   Wt 86.4 kg (190 lb 6.4 oz)   SpO2 98%   BMI 29.81 kg/m²     Vitals:    06/11/25 1057   BP: 120/64   Pulse: 66   Resp: 18   Temp: 97.8 °F (36.6 °C)   TempSrc: Temporal   SpO2: 98%   Weight: 86.4 kg (190 lb 6.4 oz)   Height: 170.2 cm (67.01\")      Physical Exam  Vitals reviewed.   Constitutional:       General: She is not in acute distress.     Appearance: Normal appearance.   HENT:      Head: Normocephalic and atraumatic.      Right Ear: Tympanic membrane, ear canal and external ear normal.      Left Ear: Tympanic membrane, ear canal and external ear normal.      Mouth/Throat:      Mouth: Mucous membranes are moist.      Pharynx: Oropharynx is clear.   Eyes:      General: No scleral icterus.     Conjunctiva/sclera: Conjunctivae normal.   Cardiovascular:      Rate and Rhythm: Normal rate and regular rhythm.      Heart sounds: Normal heart sounds.   Pulmonary:      Effort: Pulmonary effort is normal. No respiratory distress.      Breath sounds: Normal breath sounds.   Musculoskeletal:      Cervical back: Neck supple.      Right lower leg: No edema.      Left lower leg: No edema.   Lymphadenopathy:      Cervical: No cervical adenopathy.   Skin:     General: Skin is warm and dry.   Neurological:      Mental Status: She is alert and oriented to person, place, and time.   Psychiatric:         Mood and Affect: Mood normal.         Behavior: Behavior normal.        Result Review :   The following data was reviewed by: ARLENE Baxter on 06/11/2025:  CMP          11/27/2024    10:18   CMP   Glucose 109    BUN 15    Creatinine 0.81    EGFR 73    Sodium 142    Potassium 5.1    Chloride 106    Calcium 9.5    Total Protein 6.7    Albumin 4.2  "   Globulin 2.5    Total Bilirubin 0.3    Alkaline Phosphatase 73    AST (SGOT) 11    ALT (SGPT) 12    BUN/Creatinine Ratio 19      CBC w/diff          11/27/2024    10:18   CBC w/Diff   WBC 8.3    RBC 4.56    Hemoglobin 13.6    Hematocrit 41.7    MCV 91    MCH 29.8    MCHC 32.6    RDW 12.9    Platelets 297    Neutrophil Rel % 63    Lymphocyte Rel % 25    Monocyte Rel % 9    Eosinophil Rel % 2    Basophil Rel % 1      Lipid Panel          11/27/2024    10:18   Lipid Panel   Total Cholesterol 144    Triglycerides 104    HDL Cholesterol 51    VLDL Cholesterol 19    LDL Cholesterol  74      TSH          11/27/2024    10:18   TSH   TSH 3.850      A1C Last 3 Results          11/27/2024    10:18 6/11/2025    11:08   HGBA1C Last 3 Results   Hemoglobin A1C 6.3  5.7            Little interest or pleasure in doing things? Not at all   Feeling down, depressed, or hopeless? Not at all   PHQ-2 Total Score 0                 Assessment and Plan    Diagnoses and all orders for this visit:    1. Primary hypertension (Primary)  Assessment & Plan:  Chronic stable problem.  Continue losartan 50 mg daily      2. Dyslipidemia  Assessment & Plan:  Chronic stable problem.  Continue atorvastatin 20 mg daily.    Recheck lipid panel in November 2025      3. Prediabetes  Overview:  Hgba1c 6.3 % on 1/20/2023  Hgba1c 5.8 % on 4/26/2023  Hgba1c 6.3 % on 12/5/2023  Hgba1c 6.3 % on 11/27/2024  Hgba1c 5.7 % on 6/11/2025    Assessment & Plan:  Chronic problem.  Improved  Continue lifestyle changes.    Check hgba1c in 6 months      Orders:  -     POC Glycosylated Hemoglobin (Hb A1C)    4. Osteopenia, unspecified location  Overview:  DEXA 10/11/2022   DEXA 12/20/2024 = osteopenia    Assessment & Plan:  Continue daily Vitamin D and calcium supplements.    Weight bearing exercises.  Recheck DEXA in December 2026      5. Bilateral nonexudative age-related macular degeneration, unspecified stage  Assessment & Plan:  Continue follow-up with your eye doctor,  Dr. Auguste.        6. Overweight (BMI 25.0-29.9)  Assessment & Plan:  Patient's (Body mass index is 29.81 kg/m².) indicates that they are overweight with health conditions that include hypertension, dyslipidemias, and prediabetes . Weight is unchanged. BMI is above average; BMI management plan is completed. We discussed low calorie, low carb based diet program, portion control, and increasing exercise.          This medical care serves as the continuing focal point of all needed health care services.        Follow Up   Return in about 6 months (around 11/28/2025) for Medicare Wellness.  Patient was given instructions and counseling regarding her condition or for health maintenance advice. Please see specific information pulled into the AVS if appropriate.    There are no Patient Instructions on file for this visit.

## 2025-06-12 NOTE — ASSESSMENT & PLAN NOTE
Patient's (Body mass index is 29.81 kg/m².) indicates that they are overweight with health conditions that include hypertension, dyslipidemias, and prediabetes . Weight is unchanged. BMI is above average; BMI management plan is completed. We discussed low calorie, low carb based diet program, portion control, and increasing exercise.

## 2025-06-12 NOTE — ASSESSMENT & PLAN NOTE
Chronic stable problem.  Continue atorvastatin 20 mg daily.    Recheck lipid panel in November 2025

## 2025-06-12 NOTE — ASSESSMENT & PLAN NOTE
Continue daily Vitamin D and calcium supplements.    Weight bearing exercises.  Recheck DEXA in December 2026

## (undated) DEVICE — GLV SURG DERMASSURE GRN LF PF 8.5

## (undated) DEVICE — 3M™ STERI-DRAPE™ U-DRAPE 1015: Brand: STERI-DRAPE™

## (undated) DEVICE — SOL IRR H2O BTL 1000ML STRL

## (undated) DEVICE — DRILL, WL 41MM, AO SHAFT: Brand: PROFYLE

## (undated) DEVICE — KT SURG TURNOVER 050

## (undated) DEVICE — STAPLER, SKIN, 35W, A: Brand: MEDLINE INDUSTRIES, INC.

## (undated) DEVICE — KIRSCHNER WIRE
Type: IMPLANTABLE DEVICE | Site: WRIST | Status: NON-FUNCTIONAL
Brand: VARIAX
Removed: 2021-02-13

## (undated) DEVICE — GLV SURG SIGNATURE ESSENTIAL PF LTX SZ8.5

## (undated) DEVICE — GOWN,REINFORCE,POLY,SIRUS,BREATH SLV,XLG: Brand: MEDLINE

## (undated) DEVICE — CUFF TOURNI 1BLADDER 1PRT 18IN STRL

## (undated) DEVICE — DRSNG TELFA PAD NONADH STR 1S 3X4IN

## (undated) DEVICE — APPL CHLORAPREP HI/LITE TINTED 10.5ML ORNG

## (undated) DEVICE — UNDYED BRAIDED (POLYGLACTIN 910), SYNTHETIC ABSORBABLE SUTURE: Brand: COATED VICRYL

## (undated) DEVICE — SOL IRRIG NACL 1000ML

## (undated) DEVICE — PK EXTREM 50

## (undated) DEVICE — GAUZE,SPONGE,FLUFF,6"X6.75",STRL,5/TRAY: Brand: MEDLINE

## (undated) DEVICE — BNDG ELAS MATRX  2IN 5YD LF STRL